# Patient Record
Sex: FEMALE | Race: WHITE | NOT HISPANIC OR LATINO | URBAN - METROPOLITAN AREA
[De-identification: names, ages, dates, MRNs, and addresses within clinical notes are randomized per-mention and may not be internally consistent; named-entity substitution may affect disease eponyms.]

---

## 2020-10-23 PROBLEM — Z00.00 ENCOUNTER FOR PREVENTIVE HEALTH EXAMINATION: Status: ACTIVE | Noted: 2020-10-23

## 2020-10-24 ENCOUNTER — OUTPATIENT (OUTPATIENT)
Dept: OUTPATIENT SERVICES | Facility: HOSPITAL | Age: 85
LOS: 1 days | End: 2020-10-24
Payer: MEDICARE

## 2020-10-24 ENCOUNTER — RESULT REVIEW (OUTPATIENT)
Age: 85
End: 2020-10-24

## 2020-10-24 ENCOUNTER — APPOINTMENT (OUTPATIENT)
Dept: CT IMAGING | Facility: CLINIC | Age: 85
End: 2020-10-24

## 2020-10-24 DIAGNOSIS — Z00.8 ENCOUNTER FOR OTHER GENERAL EXAMINATION: ICD-10-CM

## 2020-10-24 PROCEDURE — 71260 CT THORAX DX C+: CPT

## 2020-10-24 PROCEDURE — 71260 CT THORAX DX C+: CPT | Mod: 26

## 2020-10-24 PROCEDURE — 74177 CT ABD & PELVIS W/CONTRAST: CPT | Mod: 26

## 2020-10-24 PROCEDURE — 74177 CT ABD & PELVIS W/CONTRAST: CPT

## 2020-10-27 ENCOUNTER — OUTPATIENT (OUTPATIENT)
Dept: OUTPATIENT SERVICES | Facility: HOSPITAL | Age: 85
LOS: 1 days | End: 2020-10-27
Payer: MEDICARE

## 2020-10-27 ENCOUNTER — APPOINTMENT (OUTPATIENT)
Dept: ULTRASOUND IMAGING | Facility: CLINIC | Age: 85
End: 2020-10-27
Payer: MEDICARE

## 2020-10-27 ENCOUNTER — RESULT REVIEW (OUTPATIENT)
Age: 85
End: 2020-10-27

## 2020-10-27 DIAGNOSIS — E03.9 HYPOTHYROIDISM, UNSPECIFIED: ICD-10-CM

## 2020-10-27 PROCEDURE — 76536 US EXAM OF HEAD AND NECK: CPT | Mod: 26

## 2020-10-27 PROCEDURE — 76536 US EXAM OF HEAD AND NECK: CPT

## 2022-05-03 ENCOUNTER — INPATIENT (INPATIENT)
Facility: HOSPITAL | Age: 87
LOS: 6 days | Discharge: NURSING, REHAB OR LONG-TERM CARE | DRG: 871 | End: 2022-05-10
Attending: INTERNAL MEDICINE | Admitting: INTERNAL MEDICINE
Payer: MEDICARE

## 2022-05-03 VITALS
HEART RATE: 72 BPM | TEMPERATURE: 100 F | DIASTOLIC BLOOD PRESSURE: 69 MMHG | HEIGHT: 69 IN | WEIGHT: 104.94 LBS | RESPIRATION RATE: 22 BRPM | SYSTOLIC BLOOD PRESSURE: 97 MMHG | OXYGEN SATURATION: 93 %

## 2022-05-03 DIAGNOSIS — J18.9 PNEUMONIA, UNSPECIFIED ORGANISM: ICD-10-CM

## 2022-05-03 LAB
ALBUMIN SERPL ELPH-MCNC: 1.9 G/DL — LOW (ref 3.3–5)
ALP SERPL-CCNC: 93 U/L — SIGNIFICANT CHANGE UP (ref 30–120)
ALT FLD-CCNC: 47 U/L DA — SIGNIFICANT CHANGE UP (ref 10–60)
ANION GAP SERPL CALC-SCNC: 9 MMOL/L — SIGNIFICANT CHANGE UP (ref 5–17)
APPEARANCE UR: CLEAR — SIGNIFICANT CHANGE UP
APTT BLD: 30.9 SEC — SIGNIFICANT CHANGE UP (ref 27.5–35.5)
AST SERPL-CCNC: 47 U/L — HIGH (ref 10–40)
B PERT DNA SPEC QL NAA+PROBE: SIGNIFICANT CHANGE UP
BASOPHILS # BLD AUTO: 0.04 K/UL — SIGNIFICANT CHANGE UP (ref 0–0.2)
BASOPHILS NFR BLD AUTO: 0.2 % — SIGNIFICANT CHANGE UP (ref 0–2)
BILIRUB SERPL-MCNC: 0.4 MG/DL — SIGNIFICANT CHANGE UP (ref 0.2–1.2)
BILIRUB UR-MCNC: NEGATIVE — SIGNIFICANT CHANGE UP
BUN SERPL-MCNC: 54 MG/DL — HIGH (ref 7–23)
C PNEUM DNA SPEC QL NAA+PROBE: SIGNIFICANT CHANGE UP
CALCIUM SERPL-MCNC: 8.9 MG/DL — SIGNIFICANT CHANGE UP (ref 8.4–10.5)
CHLORIDE SERPL-SCNC: 96 MMOL/L — SIGNIFICANT CHANGE UP (ref 96–108)
CO2 SERPL-SCNC: 31 MMOL/L — SIGNIFICANT CHANGE UP (ref 22–31)
COLOR SPEC: YELLOW — SIGNIFICANT CHANGE UP
CREAT SERPL-MCNC: 1.1 MG/DL — SIGNIFICANT CHANGE UP (ref 0.5–1.3)
DIFF PNL FLD: ABNORMAL
EGFR: 48 ML/MIN/1.73M2 — LOW
EOSINOPHIL # BLD AUTO: 0 K/UL — SIGNIFICANT CHANGE UP (ref 0–0.5)
EOSINOPHIL NFR BLD AUTO: 0 % — SIGNIFICANT CHANGE UP (ref 0–6)
FLUAV H1 2009 PAND RNA SPEC QL NAA+PROBE: DETECTED
FLUAV H1 RNA SPEC QL NAA+PROBE: SIGNIFICANT CHANGE UP
FLUAV H3 RNA SPEC QL NAA+PROBE: SIGNIFICANT CHANGE UP
FLUAV SUBTYP SPEC NAA+PROBE: SIGNIFICANT CHANGE UP
FLUBV RNA SPEC QL NAA+PROBE: SIGNIFICANT CHANGE UP
GLUCOSE SERPL-MCNC: 140 MG/DL — HIGH (ref 70–99)
GLUCOSE UR QL: NEGATIVE MG/DL — SIGNIFICANT CHANGE UP
HADV DNA SPEC QL NAA+PROBE: SIGNIFICANT CHANGE UP
HCOV PNL SPEC NAA+PROBE: SIGNIFICANT CHANGE UP
HCT VFR BLD CALC: 37.8 % — SIGNIFICANT CHANGE UP (ref 34.5–45)
HGB BLD-MCNC: 12.4 G/DL — SIGNIFICANT CHANGE UP (ref 11.5–15.5)
HMPV RNA SPEC QL NAA+PROBE: SIGNIFICANT CHANGE UP
HPIV1 RNA SPEC QL NAA+PROBE: SIGNIFICANT CHANGE UP
HPIV2 RNA SPEC QL NAA+PROBE: SIGNIFICANT CHANGE UP
HPIV3 RNA SPEC QL NAA+PROBE: SIGNIFICANT CHANGE UP
HPIV4 RNA SPEC QL NAA+PROBE: SIGNIFICANT CHANGE UP
IMM GRANULOCYTES NFR BLD AUTO: 1 % — SIGNIFICANT CHANGE UP (ref 0–1.5)
INR BLD: 2.11 RATIO — HIGH (ref 0.88–1.16)
KETONES UR-MCNC: NEGATIVE — SIGNIFICANT CHANGE UP
LACTATE SERPL-SCNC: 1.8 MMOL/L — SIGNIFICANT CHANGE UP (ref 0.7–2)
LEUKOCYTE ESTERASE UR-ACNC: ABNORMAL
LYMPHOCYTES # BLD AUTO: 1.66 K/UL — SIGNIFICANT CHANGE UP (ref 1–3.3)
LYMPHOCYTES # BLD AUTO: 7.5 % — LOW (ref 13–44)
MCHC RBC-ENTMCNC: 31 PG — SIGNIFICANT CHANGE UP (ref 27–34)
MCHC RBC-ENTMCNC: 32.8 GM/DL — SIGNIFICANT CHANGE UP (ref 32–36)
MCV RBC AUTO: 94.5 FL — SIGNIFICANT CHANGE UP (ref 80–100)
MONOCYTES # BLD AUTO: 0.66 K/UL — SIGNIFICANT CHANGE UP (ref 0–0.9)
MONOCYTES NFR BLD AUTO: 3 % — SIGNIFICANT CHANGE UP (ref 2–14)
NEUTROPHILS # BLD AUTO: 19.62 K/UL — HIGH (ref 1.8–7.4)
NEUTROPHILS NFR BLD AUTO: 88.3 % — HIGH (ref 43–77)
NITRITE UR-MCNC: NEGATIVE — SIGNIFICANT CHANGE UP
NRBC # BLD: 0 /100 WBCS — SIGNIFICANT CHANGE UP (ref 0–0)
NT-PROBNP SERPL-SCNC: 2077 PG/ML — HIGH (ref 0–450)
PH UR: 5 — SIGNIFICANT CHANGE UP (ref 5–8)
PLATELET # BLD AUTO: 356 K/UL — SIGNIFICANT CHANGE UP (ref 150–400)
POTASSIUM SERPL-MCNC: 3.6 MMOL/L — SIGNIFICANT CHANGE UP (ref 3.5–5.3)
POTASSIUM SERPL-SCNC: 3.6 MMOL/L — SIGNIFICANT CHANGE UP (ref 3.5–5.3)
PROT SERPL-MCNC: 7.3 G/DL — SIGNIFICANT CHANGE UP (ref 6–8.3)
PROT UR-MCNC: 15 MG/DL
PROTHROM AB SERPL-ACNC: 24.5 SEC — HIGH (ref 10.5–13.4)
RAPID RVP RESULT: DETECTED
RBC # BLD: 4 M/UL — SIGNIFICANT CHANGE UP (ref 3.8–5.2)
RBC # FLD: 14.9 % — HIGH (ref 10.3–14.5)
RV+EV RNA SPEC QL NAA+PROBE: SIGNIFICANT CHANGE UP
SARS-COV-2 RNA SPEC QL NAA+PROBE: SIGNIFICANT CHANGE UP
SODIUM SERPL-SCNC: 136 MMOL/L — SIGNIFICANT CHANGE UP (ref 135–145)
SP GR SPEC: 1.01 — SIGNIFICANT CHANGE UP (ref 1.01–1.02)
TROPONIN I, HIGH SENSITIVITY RESULT: 27.6 NG/L — SIGNIFICANT CHANGE UP
UROBILINOGEN FLD QL: NEGATIVE MG/DL — SIGNIFICANT CHANGE UP
WBC # BLD: 22.21 K/UL — HIGH (ref 3.8–10.5)
WBC # FLD AUTO: 22.21 K/UL — HIGH (ref 3.8–10.5)

## 2022-05-03 PROCEDURE — 93010 ELECTROCARDIOGRAM REPORT: CPT

## 2022-05-03 PROCEDURE — 99285 EMERGENCY DEPT VISIT HI MDM: CPT | Mod: CS

## 2022-05-03 PROCEDURE — 71045 X-RAY EXAM CHEST 1 VIEW: CPT | Mod: 26

## 2022-05-03 RX ORDER — ACETAMINOPHEN 500 MG
650 TABLET ORAL ONCE
Refills: 0 | Status: COMPLETED | OUTPATIENT
Start: 2022-05-03 | End: 2022-05-03

## 2022-05-03 RX ORDER — PIPERACILLIN AND TAZOBACTAM 4; .5 G/20ML; G/20ML
3.38 INJECTION, POWDER, LYOPHILIZED, FOR SOLUTION INTRAVENOUS EVERY 8 HOURS
Refills: 0 | Status: COMPLETED | OUTPATIENT
Start: 2022-05-03 | End: 2022-05-10

## 2022-05-03 RX ORDER — SODIUM CHLORIDE 9 MG/ML
1000 INJECTION INTRAMUSCULAR; INTRAVENOUS; SUBCUTANEOUS ONCE
Refills: 0 | Status: COMPLETED | OUTPATIENT
Start: 2022-05-03 | End: 2022-05-03

## 2022-05-03 RX ORDER — ACETAMINOPHEN 500 MG
650 TABLET ORAL EVERY 6 HOURS
Refills: 0 | Status: DISCONTINUED | OUTPATIENT
Start: 2022-05-03 | End: 2022-05-10

## 2022-05-03 RX ORDER — SODIUM CHLORIDE 9 MG/ML
1000 INJECTION INTRAMUSCULAR; INTRAVENOUS; SUBCUTANEOUS
Refills: 0 | Status: DISCONTINUED | OUTPATIENT
Start: 2022-05-03 | End: 2022-05-04

## 2022-05-03 RX ORDER — PIPERACILLIN AND TAZOBACTAM 4; .5 G/20ML; G/20ML
3.38 INJECTION, POWDER, LYOPHILIZED, FOR SOLUTION INTRAVENOUS ONCE
Refills: 0 | Status: COMPLETED | OUTPATIENT
Start: 2022-05-03 | End: 2022-05-03

## 2022-05-03 RX ORDER — AMIODARONE HYDROCHLORIDE 400 MG/1
200 TABLET ORAL DAILY
Refills: 0 | Status: DISCONTINUED | OUTPATIENT
Start: 2022-05-03 | End: 2022-05-10

## 2022-05-03 RX ORDER — IPRATROPIUM/ALBUTEROL SULFATE 18-103MCG
3 AEROSOL WITH ADAPTER (GRAM) INHALATION EVERY 6 HOURS
Refills: 0 | Status: DISCONTINUED | OUTPATIENT
Start: 2022-05-03 | End: 2022-05-10

## 2022-05-03 RX ORDER — ONDANSETRON 8 MG/1
4 TABLET, FILM COATED ORAL EVERY 8 HOURS
Refills: 0 | Status: DISCONTINUED | OUTPATIENT
Start: 2022-05-03 | End: 2022-05-10

## 2022-05-03 RX ORDER — LACTOBACILLUS ACIDOPHILUS 100MM CELL
1 CAPSULE ORAL
Refills: 0 | Status: DISCONTINUED | OUTPATIENT
Start: 2022-05-03 | End: 2022-05-10

## 2022-05-03 RX ORDER — APIXABAN 2.5 MG/1
2.5 TABLET, FILM COATED ORAL EVERY 12 HOURS
Refills: 0 | Status: DISCONTINUED | OUTPATIENT
Start: 2022-05-03 | End: 2022-05-10

## 2022-05-03 RX ORDER — LANOLIN ALCOHOL/MO/W.PET/CERES
3 CREAM (GRAM) TOPICAL AT BEDTIME
Refills: 0 | Status: DISCONTINUED | OUTPATIENT
Start: 2022-05-03 | End: 2022-05-10

## 2022-05-03 RX ORDER — MIDODRINE HYDROCHLORIDE 2.5 MG/1
10 TABLET ORAL THREE TIMES A DAY
Refills: 0 | Status: DISCONTINUED | OUTPATIENT
Start: 2022-05-03 | End: 2022-05-10

## 2022-05-03 RX ADMIN — SODIUM CHLORIDE 60 MILLILITER(S): 9 INJECTION INTRAMUSCULAR; INTRAVENOUS; SUBCUTANEOUS at 22:16

## 2022-05-03 RX ADMIN — Medication 75 MILLIGRAM(S): at 21:21

## 2022-05-03 RX ADMIN — SODIUM CHLORIDE 1000 MILLILITER(S): 9 INJECTION INTRAMUSCULAR; INTRAVENOUS; SUBCUTANEOUS at 15:40

## 2022-05-03 RX ADMIN — PIPERACILLIN AND TAZOBACTAM 3.38 GRAM(S): 4; .5 INJECTION, POWDER, LYOPHILIZED, FOR SOLUTION INTRAVENOUS at 16:10

## 2022-05-03 RX ADMIN — PIPERACILLIN AND TAZOBACTAM 25 GRAM(S): 4; .5 INJECTION, POWDER, LYOPHILIZED, FOR SOLUTION INTRAVENOUS at 21:21

## 2022-05-03 RX ADMIN — SODIUM CHLORIDE 1000 MILLILITER(S): 9 INJECTION INTRAMUSCULAR; INTRAVENOUS; SUBCUTANEOUS at 16:40

## 2022-05-03 RX ADMIN — SODIUM CHLORIDE 60 MILLILITER(S): 9 INJECTION INTRAMUSCULAR; INTRAVENOUS; SUBCUTANEOUS at 18:41

## 2022-05-03 RX ADMIN — PIPERACILLIN AND TAZOBACTAM 200 GRAM(S): 4; .5 INJECTION, POWDER, LYOPHILIZED, FOR SOLUTION INTRAVENOUS at 15:40

## 2022-05-03 RX ADMIN — Medication 650 MILLIGRAM(S): at 16:05

## 2022-05-03 RX ADMIN — Medication 650 MILLIGRAM(S): at 17:05

## 2022-05-03 NOTE — H&P ADULT - NSHPSOCIALHISTORY_GEN_ALL_CORE
Social History:    Marital Status:   Occupation:   Lives with:     Substance Use :  Tobacco Usage:  (   ) never smoked   (   ) former smoker   (   ) current smoker  (     ) pack year  (        ) last tobacco use date  Alcohol Usage:    (     ) Advanced Directives: (     ) declined   [  ] health care proxy Social History:    Marital Status:   Occupation: Retired  Lives with: Alone at SNF    Substance Use : Denies  Tobacco Usage:  Denies  Alcohol Usage: Denies    (X) Advanced Directives: (     ) declined   [X] health care proxy

## 2022-05-03 NOTE — H&P ADULT - NSHPPHYSICALEXAM_GEN_ALL_CORE
Vital Signs Last 24 Hrs  T(C): 37.8 (03 May 2022 14:55), Max: 37.8 (03 May 2022 14:55)  T(F): 100 (03 May 2022 14:55), Max: 100 (03 May 2022 14:55)  HR: 64 (03 May 2022 17:30) (64 - 72)  BP: 91/55 (03 May 2022 17:30) (91/55 - 113/58)  BP(mean): --  RR: 22 (03 May 2022 17:30) (20 - 22)  SpO2: 94% (03 May 2022 17:30) (90% - 96%) Vital Signs Last 24 Hrs  T(C): 37.8 (03 May 2022 14:55), Max: 37.8 (03 May 2022 14:55)  T(F): 100 (03 May 2022 14:55), Max: 100 (03 May 2022 14:55)  HR: 64 (03 May 2022 17:30) (64 - 72)  BP: 91/55 (03 May 2022 17:30) (91/55 - 113/58)  BP(mean): --  RR: 22 (03 May 2022 17:30) (20 - 22)  SpO2: 94% (03 May 2022 17:30) (90% - 96%)    PHYSICAL EXAM:  GENERAL: NAD, well-groomed, well-developed  HEAD:  Atraumatic, Normocephalic  EYES: Pallor +  ENMT: Moist mucous membranes, no lesions  NECK: Supple.  CHEST/LUNG: Decreased breath sounds at bases, right lower lobe rhonchi +. Occasional rhonchi left base.   HEART: S1, S2.   ABDOMEN: Soft, Nontender, Nondistended; Bowel sounds present  EXTREMITIES:  2+ Peripheral Pulses, No clubbing, cyanosis, or edema  MS: No joint swelling or deformity.   LYMPH: No lymphadenopathy noted  SKIN: No rashes or lesions  NERVOUS SYSTEM:  No focal deficit.   PSYCH:  Awake and alert but confused.

## 2022-05-03 NOTE — H&P ADULT - ASSESSMENT
89 years old female with past medical history of atrial fibrillation, COPD, history of pericardial effusion, pleural effusion in past, orthostatic hypotension, dementia, who was noted to have shortness of breath and low blood pressure at SNF. Patient was sent in to ER. In Er, patient is noted to have elevated WBC, Pneumonia on chest x-ray. She is being admitted for further care.

## 2022-05-03 NOTE — ED ADULT NURSE NOTE - CHPI ED NUR SYMPTOMS NEG
no body aches/no chest pain/no chills/no cough/no diaphoresis/no edema/no headache/no hemoptysis/no wheezing

## 2022-05-03 NOTE — PATIENT PROFILE ADULT - FALL HARM RISK - HARM RISK INTERVENTIONS

## 2022-05-03 NOTE — ED ADULT NURSE REASSESSMENT NOTE - NS ED NURSE REASSESS COMMENT FT1
Pt straight cathed using sterile technique.  90 ccs of dark yellow urine output.  Pt tolerated procedure well. Sterile specimen collected. UA and Culture sent.

## 2022-05-03 NOTE — H&P ADULT - NSICDXPASTMEDICALHX_GEN_ALL_CORE_FT
PAST MEDICAL HISTORY:  Chronic atrial fibrillation     COPD (chronic obstructive pulmonary disease)     Orthostatic hypotension     Pericardial effusion     Pleural effusion

## 2022-05-03 NOTE — ED ADULT NURSE NOTE - OBJECTIVE STATEMENT
89 YOF A&OX2 brought in by EMS for shortness of breath. as per EMS pt had low oxygen in assisted living facility. pt noted to have 92-93% oxygen on room air. pt placed on 3L NC. pt denies shortness of breath at this time. pt placed on cardiac monitor and EKG completed in ED. pt denies headaches, dizziness, blurry vision, chest pain.  fall precautions and isolation precautions in place. safety maintained.

## 2022-05-03 NOTE — ED ADULT NURSE NOTE - NS ED NOTE  TALK SOMEONE YN
To [facility].       03/05/20 1527   Post-Acute Status   Post-Acute Authorization Home Health/Hospice   Home Health/Hospice Status Referrals Sent      No

## 2022-05-03 NOTE — H&P ADULT - NSHPLABSRESULTS_GEN_ALL_CORE
12.4   22.21 )-----------( 356      ( 03 May 2022 15:36 )             37.8     03 May 2022 15:36    136    |  96     |  54     ----------------------------<  140    3.6     |  31     |  1.10     Ca    8.9        03 May 2022 15:36    TPro  7.3    /  Alb  1.9    /  TBili  0.4    /  DBili  x      /  AST  47     /  ALT  47     /  AlkPhos  93     03 May 2022 15:36    CAPILLARY BLOOD GLUCOSE        PT/INR - ( 03 May 2022 15:36 )   PT: 24.5 sec;   INR: 2.11 ratio         PTT - ( 03 May 2022 15:36 )  PTT:30.9 sec

## 2022-05-03 NOTE — H&P ADULT - NSHPREVIEWOFSYSTEMS_GEN_ALL_CORE
REVIEW OF SYSTEMS: ROS is very limited as patient is very poor historian. It is as per HPI, rest is deemed negative.   CONSTITUTIONAL: + fatigue  EYES: No eye pain, or discharge  ENMT: No sinus or throat pain  NECK: No pain or stiffness  BREASTS: No pain, masses, or nipple discharge  RESPIRATORY: + shortness of breath  CARDIOVASCULAR: No chest pain, palpitations, dizziness, or leg swelling  GASTROINTESTINAL: No abdominal or epigastric pain. No nausea, vomiting.  GENITOURINARY: No dysuria, frequency, hematuria, or incontinence  NEUROLOGICAL: No loss of strength, numbness, or tremors  SKIN: No itching, burning, rashes, or lesions   LYMPH NODES: No enlarged glands  ENDOCRINE: No polydipsia or polyuria  MUSCULOSKELETAL: No muscle, back, or extremity pain.  HEME/LYMPH: No easy bruising, or bleeding gums  ALLERGY AND IMMUNOLOGIC: No hives or eczema

## 2022-05-03 NOTE — ED PROVIDER NOTE - CLINICAL SUMMARY MEDICAL DECISION MAKING FREE TEXT BOX
Lizzy PARRISH for attending Dr. Fisher: 88 y/o female with a PMHx orthostatic hypotension and Afib from [place] brought in by EMS for evaluation of SOB and hypoxia. Pt had a low-grade fever upon triage.     Plan: EKG, CXR, labs, IV fluids, and antibiotics. Lizzy PARRISH for attending Dr. Fisher: 89 y/o female with a PMHx orthostatic hypotension and Afib from [place] brought in by EMS for evaluation of SOB and hypoxia. Pt had a low-grade fever upon triage.     Plan: EKG, CXR, labs, IV fluids, antibiotics, and Tylenol.

## 2022-05-03 NOTE — ED PROVIDER NOTE - OBJECTIVE STATEMENT
89 y/o female with PMHx orthostatic hypotension presn 88 y/o female with PMHx orthostatic hypotension and [facility] BIB[facility] from [place] due to SOB. As per EMS, pt found to be hypoxic in the 70s. As per Dr. harding, reports patient on augmentin for pneumonia and flu outbreak in [place]. pt denies chest pain, SOB, fever, vomiting, abd pain, leg swelling, or any other complaints. 88 y/o female with PMHx orthostatic hypotension and A FIb BIBA from SNF due to SOB. As per EMS, pt found to be hypoxic in the 70s. As per Dr. harding, reports patient on augmentin for pneumonia and flu outbreak in SNF. pt denies chest pain, SOB, fever, vomiting, abd pain, leg swelling, or any other complaints.

## 2022-05-03 NOTE — ED PROVIDER NOTE - PHYSICAL EXAMINATION
Constitutional: Awake, Alert, non-toxic. NAD  HEAD: Normocephalic, atraumatic.   EYES: EOM intact, conjunctiva and sclera are clear bilaterally.   ENT: No rhinorrhea, patent, mucous membranes pink/moist, no drooling or stridor.   NECK: Supple, non-tender  CARDIOVASCULAR: Normal S1, S2; regular rate and rhythm.  RESPIRATORY: Normal respiratory effort; breath sounds CTAB, no wheezes, rhonchi, or rales. Speaking in full sentences. No accessory muscle use.   ABDOMEN: Soft; non-tender, non-distended.   EXTREMITIES: Full passive and active ROM in all extremities; non-tender to palpation; distal pulses palpable and symmetric, no le edema   SKIN: Warm, dry; good skin turgor, no apparent lesions or rashes, no ecchymosis, brisk capillary refill.  NEURO: A&O x1. Sensory and motor functions are grossly intact. Speech is normal. cn 2-12 intact.

## 2022-05-03 NOTE — H&P ADULT - PROBLEM SELECTOR PLAN 2
Patient with pneumonia, possible HCAP vs post influenza pneumonia.   Will place on empiric antibiotics.   Concern is for Gram negative organisms.   Follow culture data.   ID and Pulmonary consult.

## 2022-05-03 NOTE — H&P ADULT - HISTORY OF PRESENT ILLNESS
89 years old female with past medical history of atrial fibrillation, COPD, history of pericardial effusion, pleural effusion in past, orthostatic hypotension, dementia, who was noted to have shortness of breath and low blood pressure at SNF. Patient was sent in to ER. In Er, patient is noted to have elevated WBC, Pneumonia on chest x-ray. She is being admitted for further care.     Patient is very poor historian. All information is from patient's chart and NH records.   She denies any chest pain or pressure.   No nausea or vomiting.

## 2022-05-03 NOTE — ED PROVIDER NOTE - NS ED ATTENDING STATEMENT MOD
This was a shared visit with the AR[facility]. I reviewed and verified the documentation and independently performed the documented:

## 2022-05-04 DIAGNOSIS — J18.9 PNEUMONIA, UNSPECIFIED ORGANISM: ICD-10-CM

## 2022-05-04 DIAGNOSIS — J10.1 INFLUENZA DUE TO OTHER IDENTIFIED INFLUENZA VIRUS WITH OTHER RESPIRATORY MANIFESTATIONS: ICD-10-CM

## 2022-05-04 DIAGNOSIS — A41.9 SEPSIS, UNSPECIFIED ORGANISM: ICD-10-CM

## 2022-05-04 DIAGNOSIS — I48.20 CHRONIC ATRIAL FIBRILLATION, UNSPECIFIED: ICD-10-CM

## 2022-05-04 DIAGNOSIS — Z29.9 ENCOUNTER FOR PROPHYLACTIC MEASURES, UNSPECIFIED: ICD-10-CM

## 2022-05-04 DIAGNOSIS — I95.1 ORTHOSTATIC HYPOTENSION: ICD-10-CM

## 2022-05-04 DIAGNOSIS — J44.9 CHRONIC OBSTRUCTIVE PULMONARY DISEASE, UNSPECIFIED: ICD-10-CM

## 2022-05-04 LAB
A1C WITH ESTIMATED AVERAGE GLUCOSE RESULT: 5.8 % — HIGH (ref 4–5.6)
ALBUMIN SERPL ELPH-MCNC: 1.6 G/DL — LOW (ref 3.3–5)
ALP SERPL-CCNC: 78 U/L — SIGNIFICANT CHANGE UP (ref 30–120)
ALT FLD-CCNC: 33 U/L DA — SIGNIFICANT CHANGE UP (ref 10–60)
ANION GAP SERPL CALC-SCNC: 7 MMOL/L — SIGNIFICANT CHANGE UP (ref 5–17)
AST SERPL-CCNC: 38 U/L — SIGNIFICANT CHANGE UP (ref 10–40)
BASOPHILS # BLD AUTO: 0.03 K/UL — SIGNIFICANT CHANGE UP (ref 0–0.2)
BASOPHILS NFR BLD AUTO: 0.2 % — SIGNIFICANT CHANGE UP (ref 0–2)
BILIRUB SERPL-MCNC: 0.4 MG/DL — SIGNIFICANT CHANGE UP (ref 0.2–1.2)
BUN SERPL-MCNC: 39 MG/DL — HIGH (ref 7–23)
CALCIUM SERPL-MCNC: 8.2 MG/DL — LOW (ref 8.4–10.5)
CHLORIDE SERPL-SCNC: 103 MMOL/L — SIGNIFICANT CHANGE UP (ref 96–108)
CHOLEST SERPL-MCNC: 106 MG/DL — SIGNIFICANT CHANGE UP
CO2 SERPL-SCNC: 30 MMOL/L — SIGNIFICANT CHANGE UP (ref 22–31)
CREAT SERPL-MCNC: 0.93 MG/DL — SIGNIFICANT CHANGE UP (ref 0.5–1.3)
EGFR: 59 ML/MIN/1.73M2 — LOW
EOSINOPHIL # BLD AUTO: 0.01 K/UL — SIGNIFICANT CHANGE UP (ref 0–0.5)
EOSINOPHIL NFR BLD AUTO: 0.1 % — SIGNIFICANT CHANGE UP (ref 0–6)
ESTIMATED AVERAGE GLUCOSE: 120 MG/DL — HIGH (ref 68–114)
GLUCOSE SERPL-MCNC: 117 MG/DL — HIGH (ref 70–99)
HCT VFR BLD CALC: 33.7 % — LOW (ref 34.5–45)
HDLC SERPL-MCNC: 20 MG/DL — LOW
HGB BLD-MCNC: 11 G/DL — LOW (ref 11.5–15.5)
IMM GRANULOCYTES NFR BLD AUTO: 1 % — SIGNIFICANT CHANGE UP (ref 0–1.5)
LIPID PNL WITH DIRECT LDL SERPL: 57 MG/DL — SIGNIFICANT CHANGE UP
LYMPHOCYTES # BLD AUTO: 1.39 K/UL — SIGNIFICANT CHANGE UP (ref 1–3.3)
LYMPHOCYTES # BLD AUTO: 8 % — LOW (ref 13–44)
MCHC RBC-ENTMCNC: 31.5 PG — SIGNIFICANT CHANGE UP (ref 27–34)
MCHC RBC-ENTMCNC: 32.6 GM/DL — SIGNIFICANT CHANGE UP (ref 32–36)
MCV RBC AUTO: 96.6 FL — SIGNIFICANT CHANGE UP (ref 80–100)
MONOCYTES # BLD AUTO: 0.55 K/UL — SIGNIFICANT CHANGE UP (ref 0–0.9)
MONOCYTES NFR BLD AUTO: 3.2 % — SIGNIFICANT CHANGE UP (ref 2–14)
NEUTROPHILS # BLD AUTO: 15.12 K/UL — HIGH (ref 1.8–7.4)
NEUTROPHILS NFR BLD AUTO: 87.5 % — HIGH (ref 43–77)
NON HDL CHOLESTEROL: 87 MG/DL — SIGNIFICANT CHANGE UP
NRBC # BLD: 0 /100 WBCS — SIGNIFICANT CHANGE UP (ref 0–0)
PLATELET # BLD AUTO: 295 K/UL — SIGNIFICANT CHANGE UP (ref 150–400)
POTASSIUM SERPL-MCNC: 2.9 MMOL/L — CRITICAL LOW (ref 3.5–5.3)
POTASSIUM SERPL-SCNC: 2.9 MMOL/L — CRITICAL LOW (ref 3.5–5.3)
PROCALCITONIN SERPL-MCNC: 0.22 NG/ML — HIGH (ref 0.02–0.1)
PROT SERPL-MCNC: 6.3 G/DL — SIGNIFICANT CHANGE UP (ref 6–8.3)
RBC # BLD: 3.49 M/UL — LOW (ref 3.8–5.2)
RBC # FLD: 15 % — HIGH (ref 10.3–14.5)
SODIUM SERPL-SCNC: 140 MMOL/L — SIGNIFICANT CHANGE UP (ref 135–145)
TRIGL SERPL-MCNC: 147 MG/DL — SIGNIFICANT CHANGE UP
WBC # BLD: 17.28 K/UL — HIGH (ref 3.8–10.5)
WBC # FLD AUTO: 17.28 K/UL — HIGH (ref 3.8–10.5)

## 2022-05-04 RX ORDER — POTASSIUM CHLORIDE 20 MEQ
20 PACKET (EA) ORAL
Refills: 0 | Status: DISCONTINUED | OUTPATIENT
Start: 2022-05-04 | End: 2022-05-04

## 2022-05-04 RX ORDER — SODIUM CHLORIDE 9 MG/ML
1000 INJECTION INTRAMUSCULAR; INTRAVENOUS; SUBCUTANEOUS
Refills: 0 | Status: DISCONTINUED | OUTPATIENT
Start: 2022-05-04 | End: 2022-05-05

## 2022-05-04 RX ORDER — POTASSIUM CHLORIDE 20 MEQ
20 PACKET (EA) ORAL EVERY 4 HOURS
Refills: 0 | Status: COMPLETED | OUTPATIENT
Start: 2022-05-04 | End: 2022-05-04

## 2022-05-04 RX ORDER — POTASSIUM CHLORIDE 20 MEQ
10 PACKET (EA) ORAL
Refills: 0 | Status: COMPLETED | OUTPATIENT
Start: 2022-05-04 | End: 2022-05-04

## 2022-05-04 RX ADMIN — MIDODRINE HYDROCHLORIDE 10 MILLIGRAM(S): 2.5 TABLET ORAL at 17:02

## 2022-05-04 RX ADMIN — Medication 100 MILLIEQUIVALENT(S): at 12:25

## 2022-05-04 RX ADMIN — Medication 600 MILLIGRAM(S): at 18:15

## 2022-05-04 RX ADMIN — Medication 1 TABLET(S): at 08:31

## 2022-05-04 RX ADMIN — Medication 100 MILLIEQUIVALENT(S): at 13:47

## 2022-05-04 RX ADMIN — MIDODRINE HYDROCHLORIDE 10 MILLIGRAM(S): 2.5 TABLET ORAL at 05:24

## 2022-05-04 RX ADMIN — Medication 20 MILLIEQUIVALENT(S): at 18:14

## 2022-05-04 RX ADMIN — Medication 100 MILLIEQUIVALENT(S): at 11:13

## 2022-05-04 RX ADMIN — Medication 1 TABLET(S): at 17:02

## 2022-05-04 RX ADMIN — Medication 30 MILLIGRAM(S): at 05:24

## 2022-05-04 RX ADMIN — Medication 20 MILLIEQUIVALENT(S): at 15:23

## 2022-05-04 RX ADMIN — MIDODRINE HYDROCHLORIDE 10 MILLIGRAM(S): 2.5 TABLET ORAL at 12:25

## 2022-05-04 RX ADMIN — Medication 600 MILLIGRAM(S): at 05:24

## 2022-05-04 RX ADMIN — APIXABAN 2.5 MILLIGRAM(S): 2.5 TABLET, FILM COATED ORAL at 18:15

## 2022-05-04 RX ADMIN — Medication 1 TABLET(S): at 12:25

## 2022-05-04 RX ADMIN — Medication 30 MILLIGRAM(S): at 18:15

## 2022-05-04 RX ADMIN — PIPERACILLIN AND TAZOBACTAM 25 GRAM(S): 4; .5 INJECTION, POWDER, LYOPHILIZED, FOR SOLUTION INTRAVENOUS at 22:09

## 2022-05-04 RX ADMIN — APIXABAN 2.5 MILLIGRAM(S): 2.5 TABLET, FILM COATED ORAL at 05:25

## 2022-05-04 RX ADMIN — SODIUM CHLORIDE 40 MILLILITER(S): 9 INJECTION INTRAMUSCULAR; INTRAVENOUS; SUBCUTANEOUS at 15:23

## 2022-05-04 RX ADMIN — SODIUM CHLORIDE 60 MILLILITER(S): 9 INJECTION INTRAMUSCULAR; INTRAVENOUS; SUBCUTANEOUS at 11:14

## 2022-05-04 RX ADMIN — PIPERACILLIN AND TAZOBACTAM 25 GRAM(S): 4; .5 INJECTION, POWDER, LYOPHILIZED, FOR SOLUTION INTRAVENOUS at 15:22

## 2022-05-04 RX ADMIN — PIPERACILLIN AND TAZOBACTAM 25 GRAM(S): 4; .5 INJECTION, POWDER, LYOPHILIZED, FOR SOLUTION INTRAVENOUS at 05:23

## 2022-05-04 NOTE — CONSULT NOTE ADULT - SUBJECTIVE AND OBJECTIVE BOX
HPI:  88YO F PMH atrial fibrillation, COPD, history of pericardial effusion, pleural effusion in past, orthostatic hypotension, dementia, presented from [facility] with  shortness of breath and low blood pressure found to have Influenza A with ovidio pneumonia. WBC 22K. Pt is a poor historian. No documented n/v/d CP abd pain.    Infectious Disease consult was called to evaluate pt.    Past Medical & Surgical Hx:  PAST MEDICAL & SURGICAL HISTORY:  Chronic atrial fibrillation  Orthostatic hypotension  COPD (chronic obstructive pulmonary disease)  Pericardial effusion  Pleural effusion  No significant past surgical history      Social History--  EtOH: denies   Tobacco: denies   Drug Use: denies     FAMILY HISTORY:  Noncontributory    Allergies  No Known Allergies    Intolerances  NONE      Home Medications:      Current Inpatient Medications :    ANTIBIOTICS:   oseltamivir 30 milliGRAM(s) Oral two times a day  piperacillin/tazobactam IVPB.. 3.375 Gram(s) IV Intermittent every 8 hours      OTHER RELEVANT MEDICATIONS :  acetaminophen     Tablet .. 650 milliGRAM(s) Oral every 6 hours PRN  albuterol/ipratropium for Nebulization 3 milliLiter(s) Nebulizer every 6 hours PRN  aluminum hydroxide/magnesium hydroxide/simethicone Suspension 30 milliLiter(s) Oral every 4 hours PRN  aMIOdarone    Tablet 200 milliGRAM(s) Oral daily  apixaban 2.5 milliGRAM(s) Oral every 12 hours  guaiFENesin  milliGRAM(s) Oral every 12 hours  melatonin 3 milliGRAM(s) Oral at bedtime PRN  midodrine. 10 milliGRAM(s) Oral three times a day  ondansetron Injectable 4 milliGRAM(s) IV Push every 8 hours PRN  potassium chloride   Powder 20 milliEquivalent(s) Oral every 4 hours  sodium chloride 0.9%. 1000 milliLiter(s) IV Continuous <Continuous>      ROS:  Unable to obtain due to : Dementia    Physical Exam:  Vital Signs Last 24 Hrs  T(C): 36.4 (04 May 2022 05:00), Max: 36.5 (04 May 2022 05:00)  T(F): 97.6 (04 May 2022 05:00), Max: 97.7 (04 May 2022 05:00)  HR: 62 (04 May 2022 05:00) (60 - 66)  BP: 97/54 (04 May 2022 05:00) (91/55 - 99/58)  BP(mean): 67 (04 May 2022 05:00) (67 - 74)  RR: 18 (04 May 2022 05:00) (18 - 93)  SpO2: 93% (04 May 2022 05:00) (90% - 98%)      General: no acute distress  Neck: supple, trachea midline  Lungs: Decreased no wheeze/rhonchi  Cardiovascular: regular rate and rhythm, S1 S2  Abdomen: soft, nontender, ND, bowel sounds normal  Neurological:  awake confused  Skin: no rash  Extremities: no edema    Labs:    Complete Blood Count + Automated Diff (05.03.22 @ 15:36)    WBC Count: 22.21 K/uL    RBC Count: 4.00 M/uL    Hemoglobin: 12.4 g/dL    Hematocrit: 37.8 %    Mean Cell Volume: 94.5 fl    Mean Cell Hemoglobin: 31.0 pg    Mean Cell Hemoglobin Conc: 32.8 gm/dL    Red Cell Distrib Width: 14.9 %    Platelet Count - Automated: 356 K/uL                        11.0   17.28 )-----------( 295      ( 04 May 2022 05:30 )             33.7   05-04    140  |  103  |  39<H>  ----------------------------<  117<H>  2.9<LL>   |  30  |  0.93    Ca    8.2<L>      04 May 2022 05:30    TPro  6.3  /  Alb  1.6<L>  /  TBili  0.4  /  DBili  x   /  AST  38  /  ALT  33  /  AlkPhos  78  05-04      RECENT CULTURES:  Respiratory Viral Panel with COVID-19 by ALEJANDRA (05.03.22 @ 15:36)    Rapid RVP Result: Detected    SARS-CoV-2: NotDetec: This Respiratory Panel uses polymerase chain reaction (PCR) to detect for  adenovirus; coronavirus (HKU1, NL63, 229E, OC43); human metapneumovirus  (hMPV); human enterovirus/rhinovirus (Entero/RV); influenza A; influenza  A/H1; influenza A/H3; influenza A/H1-2009; influenza B; parainfluenza  viruses 1, 2, 3, 4; respiratory syncytial virus; Mycoplasma pneumoniae;  Chlamydophila pneumoniae; and SARS-CoV-2.    Adenovirus (RapRVP): NotDetec    Influenza A (RapRVP): NotDetec    Influenza AH1 2009 (RapRVP): NotDetec    Influenza AH1 (RapRVP): NotDetec    Influenza AH3 (RapRVP): Detected    Influenza B (RapRVP): NotDetec    Parainfluenza 1 (RapRVP): NotDetec    Parainfluenza 2 (RapRVP): NotDetec    Parainfluenza 3 (RapRVP): NotDetec    Parainfluenza 4 (RapRVP): NotDetec    Chlamydia pneumoniae (RapRVP): NotDetec    Mycoplasma pneumoniae (RapRVP): NotDetec    Entero/Rhinovirus (RapRVP): NotDetec    hMPV (RapRVP): NotDetec    Coronavirus (229E,HKU1,NL63,OC43): NotDetec    RADIOLOGY & ADDITIONAL STUDIES:    ACC: [id number] EXAM:  XR CHEST PORTABLE URGENT 1V                          PROCEDURE DATE:  05/03/2022          INTERPRETATION:  Sepsis.    AP chest.    Normal heart size. Atherosclerotic aorta. Hyperinflated lungs. Airspace   infiltrates in the right mid and lower lung field and at the left base   consistent with bilateral pneumonia. Trace bilateral pleural effusions.   Question underlying nodular opacity left base. Correlate with chest CT.   Calcified granuloma right apex. Biapical pleural thickening.    IMPRESSION: Bilateral pneumonia. Question underlying nodular opacity left   base. Correlate with chest CT.    Assessment :   88YO F PMH atrial fibrillation, COPD, history of pericardial effusion, pleural effusion in past, orthostatic hypotension, dementia, presented from [facility] admitted with Influenza A with ovidio pneumonia. - probable superimposed bacterial pneumonia. WBC 22K.    Plan :   Tamiflu x 5 days  Cont Zosyn  Fu cultures  Get legionella and pneumococcal antigen  Trend temps and cbc  Asp precautions    Continue with present regiment .  Approptiate use of antibiotics and adverse effects reviewed.      > 45 minutes spent in direct patient care reviewing  the notes, lab data/ imaging , discussion with multidisciplinary team. All questions were addressed and answered to the best of my capacity .    Thank you for allowing me to participate in the care of your patient .      Karthikeyan Dai MD  Infectious Disease  [phone]
PULMONARY/CRITICAL CARE      Pt. is poor historian.  Patient is a 89y old  Female who presents with a chief complaint of Shortness of breath, low BP (03 May 2022 18:04)/ right pneumonia.    BRIEF HOSPITAL COURSE: ***· Chief Complaint:  89y Female complaining of shortness of breath.  · HPI Objective Statement: 88 y/o female with PMHx orthostatic hypotension and A FIb BIBA from SNF due to SOB. As per EMS, pt found to be hypoxic in the 70s. As per Dr. harding, reports patient on augmentin for pneumonia and flu outbreak in SNF. pt denies chest pain, SOB, fever, vomiting, abd pain, leg swelling, or any other complaints.  She claims she has had influenza vaccine.     Events last 24 hours: ***    PAST MEDICAL & SURGICAL HISTORY:  Chronic atrial fibrillation    Orthostatic hypotension    COPD (chronic obstructive pulmonary disease)    Pericardial effusion    Pleural effusion    No significant past surgical history      Allergies    No Known Allergies    Intolerances      FAMILY HISTORY/ social: denies cigs, etoh      Review of Systems:  CONSTITUTIONAL: No fever, chills, or fatigue  EYES: No eye pain, visual disturbances, or discharge  ENMT:  No difficulty hearing, tinnitus, vertigo; No sinus or throat pain  NECK: No pain or stiffness  RESPIRATORY: No cough, wheezing, chills or hemoptysis; mild shortness of breath  CARDIOVASCULAR: No chest pain, palpitations, dizziness, or leg swelling  GASTROINTESTINAL: No abdominal or epigastric pain. No nausea, vomiting, or hematemesis; No diarrhea or constipation. No melena or hematochezia.  GENITOURINARY: No dysuria, frequency, hematuria, or incontinence  NEUROLOGICAL: No headaches, memory loss, loss of strength, numbness, or tremors  SKIN: No itching, burning, rashes, or lesions   MUSCULOSKELETAL: No joint pain or swelling; No muscle, back, or extremity pain  PSYCHIATRIC: No depression, anxiety, mood swings, or difficulty sleeping      Medications:  oseltamivir 30 milliGRAM(s) Oral two times a day  piperacillin/tazobactam IVPB.. 3.375 Gram(s) IV Intermittent every 8 hours    aMIOdarone    Tablet 200 milliGRAM(s) Oral daily  midodrine. 10 milliGRAM(s) Oral three times a day    albuterol/ipratropium for Nebulization 3 milliLiter(s) Nebulizer every 6 hours PRN  guaiFENesin  milliGRAM(s) Oral every 12 hours    acetaminophen     Tablet .. 650 milliGRAM(s) Oral every 6 hours PRN  melatonin 3 milliGRAM(s) Oral at bedtime PRN  ondansetron Injectable 4 milliGRAM(s) IV Push every 8 hours PRN      apixaban 2.5 milliGRAM(s) Oral every 12 hours    aluminum hydroxide/magnesium hydroxide/simethicone Suspension 30 milliLiter(s) Oral every 4 hours PRN        potassium chloride  20 mEq/100 mL IVPB 20 milliEquivalent(s) IV Intermittent every 2 hours  sodium chloride 0.9%. 1000 milliLiter(s) IV Continuous <Continuous>        lactobacillus acidophilus 1 Tablet(s) Oral three times a day with meals          ICU Vital Signs Last 24 Hrs  T(C): 36.5 (04 May 2022 05:00), Max: 37.8 (03 May 2022 14:55)  T(F): 97.7 (04 May 2022 05:00), Max: 100 (03 May 2022 14:55)  HR: 62 (04 May 2022 05:00) (60 - 72)  BP: 97/54 (04 May 2022 05:00) (91/55 - 113/58)  BP(mean): 67 (04 May 2022 05:00) (67 - 74)  ABP: --  ABP(mean): --  RR: 18 (04 May 2022 05:00) (18 - 22)  SpO2: 96% (04 May 2022 05:00) (90% - 98%)    Vital Signs Last 24 Hrs  T(C): 36.5 (04 May 2022 05:00), Max: 37.8 (03 May 2022 14:55)  T(F): 97.7 (04 May 2022 05:00), Max: 100 (03 May 2022 14:55)  HR: 62 (04 May 2022 05:00) (60 - 72)  BP: 97/54 (04 May 2022 05:00) (91/55 - 113/58)  BP(mean): 67 (04 May 2022 05:00) (67 - 74)  RR: 18 (04 May 2022 05:00) (18 - 22)  SpO2: 96% (04 May 2022 05:00) (90% - 98%)        I&O's Detail        LABS:                        11.0   17.28 )-----------( 295      ( 04 May 2022 05:30 )             33.7     05-04    140  |  103  |  39<H>  ----------------------------<  117<H>  2.9<LL>   |  30  |  0.93    Ca    8.2<L>      04 May 2022 05:30    TPro  6.3  /  Alb  1.6<L>  /  TBili  0.4  /  DBili  x   /  AST  38  /  ALT  33  /  AlkPhos  78  05-04          CAPILLARY BLOOD GLUCOSE        PT/INR - ( 03 May 2022 15:36 )   PT: 24.5 sec;   INR: 2.11 ratio         PTT - ( 03 May 2022 15:36 )  PTT:30.9 sec  Urinalysis Basic - ( 03 May 2022 16:06 )    Color: Yellow / Appearance: Clear / S.015 / pH: x  Gluc: x / Ketone: Negative  / Bili: Negative / Urobili: Negative mg/dL   Blood: x / Protein: 15 mg/dL / Nitrite: Negative   Leuk Esterase: Moderate / RBC: 0-2 /HPF / WBC 6-10   Sq Epi: x / Non Sq Epi: Few / Bacteria: Few      CULTURES:      Physical Examination:    General: No acute distress.  thin elderly female sitting up comfortably    HEENT: Pupils equal, reactive to light.  Symmetric. poor dentition    PULM: Clear to auscultation bilaterally, no wheeze rhonchi rales no change percussion    CVS: irregular rate and rhythm, no murmurs, rubs, or gallops    ABD: Soft, nondistended, nontender, normoactive bowel sounds, no masses    EXT: No edema, nontender calves    SKIN: Warm and well perfused, no rashes noted.    NEURO: Alert, somewhat confused, interactive, nonfocal    RADIOLOGY: ***CXR RLL infiltrate    CRITICAL CARE TIME SPENT: ***

## 2022-05-04 NOTE — GOALS OF CARE CONVERSATION - ADVANCED CARE PLANNING - CONVERSATION DETAILS
Writer  spoke with pt son Syed. Reviewed patient's medical and social history as well as events leading to patient's hospitalization. Writer discussed patient's current diagnosis COVID, afib, pleural effusion, dementia , advanced age, debility, impaired mental status  medical condition and management, Pt arrived with [facility] stating DNR/DNI and pt son states there are no changres. Writer  spoke with pt son Syed. Reviewed patient's medical and social history as well as events leading to patient's hospitalization. Writer discussed patient's current diagnosis Flu A, afib, pleural effusion, dementia , advanced age, debility, impaired mental status  medical condition and management, Pt arrived with [facility] stating DNR/DNI and pt son states there are no changres.

## 2022-05-04 NOTE — SWALLOW BEDSIDE ASSESSMENT ADULT - SWALLOW EVAL: RECOMMENDED FEEDING/EATING TECHNIQUES
alternate food with liquid/crush medication (when feasible)/maintain upright posture during/after eating for 30 mins/no straws/oral hygiene/position upright (90 degrees)/small sips/bites

## 2022-05-04 NOTE — SWALLOW BEDSIDE ASSESSMENT ADULT - PHARYNGEAL PHASE
Delayed pharyngeal swallow inconsistent throat clear, noted to be more persistent as viscosity of PO decreased, ? baseline congestion vs. potential penetration/aspiration event/Delayed pharyngeal swallow/Throat clear post oral intake

## 2022-05-04 NOTE — PROGRESS NOTE ADULT - SUBJECTIVE AND OBJECTIVE BOX
Patient is a 89y old  Female who presents with a chief complaint of Shortness of breath, low BP (04 May 2022 12:46)    HPI:  89 years old female with past medical history of atrial fibrillation, COPD, history of pericardial effusion, pleural effusion in past, orthostatic hypotension, dementia, who was noted to have shortness of breath and low blood pressure at SNF. Patient was sent in to ER. In Er, patient is noted to have elevated WBC, Pneumonia on chest x-ray. She is being admitted for further care.     Patient is very poor historian. All information is from patient's chart and NH records.   She denies any chest pain or pressure.   No nausea or vomiting.  (03 May 2022 18:04)    INTERVAL HPI/OVERNIGHT EVENTS:  Chart reviewed, notes reviewed.   Patient seen and examined.  Being followed by following specialists.     Consultant(s) Notes Reviewed:  [X] Yes    Care Discussed with Consultants/Other Providers: [X] Yes    REVIEW OF SYSTEMS:  CONSTITUTIONAL: No fever, weight loss, or fatigue  EYES: No eye pain, or discharge  ENMT: No sinus or throat pain  NECK: No pain or stiffness  BREASTS: No pain, masses, or nipple discharge  RESPIRATORY: No cough, wheezing, chills or hemoptysis; No shortness of breath  CARDIOVASCULAR: No chest pain, palpitations, dizziness, or leg swelling  GASTROINTESTINAL: No abdominal or epigastric pain. No nausea, vomiting.  GENITOURINARY: No dysuria, frequency, hematuria, or incontinence  NEUROLOGICAL: No loss of strength, numbness, or tremors  SKIN: No itching, burning, rashes, or lesions   LYMPH NODES: No enlarged glands  ENDOCRINE: No polydipsia or polyuria  MUSCULOSKELETAL: No muscle, back, or extremity pain  PSYCHIATRIC: No depression, anxiety, mood swings.  HEME/LYMPH: No easy bruising, or bleeding gums  ALLERGY AND IMMUNOLOGIC: No hives or eczema    Allergies    No Known Allergies    Intolerances      Home Medications:    MEDICATIONS  (STANDING):  aMIOdarone    Tablet 200 milliGRAM(s) Oral daily  apixaban 2.5 milliGRAM(s) Oral every 12 hours  guaiFENesin  milliGRAM(s) Oral every 12 hours  lactobacillus acidophilus 1 Tablet(s) Oral three times a day with meals  midodrine. 10 milliGRAM(s) Oral three times a day  oseltamivir 30 milliGRAM(s) Oral two times a day  piperacillin/tazobactam IVPB.. 3.375 Gram(s) IV Intermittent every 8 hours  potassium chloride   Powder 20 milliEquivalent(s) Oral every 4 hours  sodium chloride 0.9%. 1000 milliLiter(s) (40 mL/Hr) IV Continuous <Continuous>    MEDICATIONS  (PRN):  acetaminophen     Tablet .. 650 milliGRAM(s) Oral every 6 hours PRN Temp greater or equal to 38C (100.4F), Mild Pain (1 - 3)  albuterol/ipratropium for Nebulization 3 milliLiter(s) Nebulizer every 6 hours PRN Shortness of Breath and/or Wheezing  aluminum hydroxide/magnesium hydroxide/simethicone Suspension 30 milliLiter(s) Oral every 4 hours PRN Dyspepsia  melatonin 3 milliGRAM(s) Oral at bedtime PRN Insomnia  ondansetron Injectable 4 milliGRAM(s) IV Push every 8 hours PRN Nausea and/or Vomiting    Vital Signs Last 24 Hrs  T(C): 36.4 (04 May 2022 17:11), Max: 36.5 (04 May 2022 05:00)  T(F): 97.5 (04 May 2022 17:11), Max: 97.7 (04 May 2022 05:00)  HR: 94 (04 May 2022 17:11) (60 - 94)  BP: 103/56 (04 May 2022 17:11) (96/55 - 103/56)  BP(mean): 67 (04 May 2022 05:00) (67 - 74)  RR: 24 (04 May 2022 17:11) (18 - 93)  SpO2: 95% (04 May 2022 17:11) (93% - 98%)    PHYSICAL EXAM:  GENERAL: NAD, well-groomed, well-developed  HEAD:  Atraumatic, Normocephalic  EYES: EOMI, PERRLA, conjunctiva and sclera clear  ENMT: Moist mucous membranes, no lesions  NECK: Supple.  CHEST/LUNG: Clear to auscultation bilaterally; No rales, rhonchi, wheezing, or rubs  HEART: S1, S2.   ABDOMEN: Soft, Nontender, Nondistended; Bowel sounds present  EXTREMITIES:  2+ Peripheral Pulses, No clubbing, cyanosis, or edema  MS: No joint swelling or deformity.   LYMPH: No lymphadenopathy noted  SKIN: No rashes or lesions  NERVOUS SYSTEM:  No focal deficit.   PSYCH:  Awake and alert.   LABS:                         11.0   17.28 )-----------( 295      ( 04 May 2022 05:30 )             33.7     04 May 2022 05:30    140    |  103    |  39     ----------------------------<  117    2.9     |  30     |  0.93     Ca    8.2        04 May 2022 05:30    TPro  6.3    /  Alb  1.6    /  TBili  0.4    /  DBili  x      /  AST  38     /  ALT  33     /  AlkPhos  78     04 May 2022 05:30    CAPILLARY BLOOD GLUCOSE        PT/INR - ( 03 May 2022 15:36 )   PT: 24.5 sec;   INR: 2.11 ratio         PTT - ( 03 May 2022 15:36 )  PTT:30.9 sec     Chol 106 LDL -- HDL 20<L> Trig 147            Procalcitonin, Serum: 0.22 ng/mL (22 @ 07:28)    Urinalysis Basic - ( 03 May 2022 16:06 )    Color: Yellow / Appearance: Clear / S.015 / pH: x  Gluc: x / Ketone: Negative  / Bili: Negative / Urobili: Negative mg/dL   Blood: x / Protein: 15 mg/dL / Nitrite: Negative   Leuk Esterase: Moderate / RBC: 0-2 /HPF / WBC 6-10   Sq Epi: x / Non Sq Epi: Few / Bacteria: Few          RADIOLOGY TEST: (IMAGES REVIEWED BY ME)    Imaging Personally Reviewed:  [X] YES      HEALTH ISSUES - PROBLEM Dx:  Sepsis    Pneumonia    Influenza A    Chronic atrial fibrillation    COPD (chronic obstructive pulmonary disease)    Orthostatic hypotension    Prophylactic measure         Patient is a 89y old  Female who presents with a chief complaint of Shortness of breath, low BP (04 May 2022 12:46)    HPI:  89 years old female with past medical history of atrial fibrillation, COPD, history of pericardial effusion, pleural effusion in past, orthostatic hypotension, dementia, who was noted to have shortness of breath and low blood pressure at SNF. Patient was sent in to ER. In Er, patient is noted to have elevated WBC, Pneumonia on chest x-ray. She is being admitted for further care.     Patient is very poor historian. All information is from patient's chart and NH records.   She denies any chest pain or pressure.   No nausea or vomiting.  (03 May 2022 18:04)    INTERVAL HPI/OVERNIGHT EVENTS:  Chart reviewed, notes reviewed.   Patient seen and examined.  Being followed by following specialists: ID, pulmonary    Consultant(s) Notes Reviewed:  [X] Yes    Care Discussed with Consultants/Other Providers: [X] Yes    2022 -->     REVIEW OF SYSTEMS:  CONSTITUTIONAL: No fever, weight loss, or fatigue  EYES: No eye pain, or discharge  ENMT: No sinus or throat pain  NECK: No pain or stiffness  BREASTS: No pain, masses, or nipple discharge  RESPIRATORY: No cough, wheezing, chills or hemoptysis; No shortness of breath  CARDIOVASCULAR: No chest pain, palpitations, dizziness, or leg swelling  GASTROINTESTINAL: No abdominal or epigastric pain. No nausea, vomiting.  GENITOURINARY: No dysuria, frequency, hematuria, or incontinence  NEUROLOGICAL: No loss of strength, numbness, or tremors  SKIN: No itching, burning, rashes, or lesions   LYMPH NODES: No enlarged glands  ENDOCRINE: No polydipsia or polyuria  MUSCULOSKELETAL: No muscle, back, or extremity pain  PSYCHIATRIC: No depression, anxiety, mood swings.  HEME/LYMPH: No easy bruising, or bleeding gums  ALLERGY AND IMMUNOLOGIC: No hives or eczema    Allergies    No Known Allergies    Intolerances      Home Medications:    MEDICATIONS  (STANDING):  aMIOdarone    Tablet 200 milliGRAM(s) Oral daily  apixaban 2.5 milliGRAM(s) Oral every 12 hours  guaiFENesin  milliGRAM(s) Oral every 12 hours  lactobacillus acidophilus 1 Tablet(s) Oral three times a day with meals  midodrine. 10 milliGRAM(s) Oral three times a day  oseltamivir 30 milliGRAM(s) Oral two times a day  piperacillin/tazobactam IVPB.. 3.375 Gram(s) IV Intermittent every 8 hours  potassium chloride   Powder 20 milliEquivalent(s) Oral every 4 hours  sodium chloride 0.9%. 1000 milliLiter(s) (40 mL/Hr) IV Continuous <Continuous>    MEDICATIONS  (PRN):  acetaminophen     Tablet .. 650 milliGRAM(s) Oral every 6 hours PRN Temp greater or equal to 38C (100.4F), Mild Pain (1 - 3)  albuterol/ipratropium for Nebulization 3 milliLiter(s) Nebulizer every 6 hours PRN Shortness of Breath and/or Wheezing  aluminum hydroxide/magnesium hydroxide/simethicone Suspension 30 milliLiter(s) Oral every 4 hours PRN Dyspepsia  melatonin 3 milliGRAM(s) Oral at bedtime PRN Insomnia  ondansetron Injectable 4 milliGRAM(s) IV Push every 8 hours PRN Nausea and/or Vomiting    Vital Signs Last 24 Hrs  T(C): 36.4 (04 May 2022 17:11), Max: 36.5 (04 May 2022 05:00)  T(F): 97.5 (04 May 2022 17:11), Max: 97.7 (04 May 2022 05:00)  HR: 94 (04 May 2022 17:11) (60 - 94)  BP: 103/56 (04 May 2022 17:11) (96/55 - 103/56)  BP(mean): 67 (04 May 2022 05:00) (67 - 74)  RR: 24 (04 May 2022 17:11) (18 - 93)  SpO2: 95% (04 May 2022 17:11) (93% - 98%)    PHYSICAL EXAM:  GENERAL: NAD, well-groomed, well-developed  HEAD:  Atraumatic, Normocephalic  EYES: EOMI, PERRLA, conjunctiva and sclera clear  ENMT: Moist mucous membranes, no lesions  NECK: Supple.  CHEST/LUNG: Clear to auscultation bilaterally; No rales, rhonchi, wheezing, or rubs  HEART: S1, S2.   ABDOMEN: Soft, Nontender, Nondistended; Bowel sounds present  EXTREMITIES:  2+ Peripheral Pulses, No clubbing, cyanosis, or edema  MS: No joint swelling or deformity.   LYMPH: No lymphadenopathy noted  SKIN: No rashes or lesions  NERVOUS SYSTEM:  No focal deficit.   PSYCH:  Awake and alert.   LABS:                         11.0   17.28 )-----------( 295      ( 04 May 2022 05:30 )             33.7     04 May 2022 05:30    140    |  103    |  39     ----------------------------<  117    2.9     |  30     |  0.93     Ca    8.2        04 May 2022 05:30    TPro  6.3    /  Alb  1.6    /  TBili  0.4    /  DBili  x      /  AST  38     /  ALT  33     /  AlkPhos  78     04 May 2022 05:30    CAPILLARY BLOOD GLUCOSE        PT/INR - ( 03 May 2022 15:36 )   PT: 24.5 sec;   INR: 2.11 ratio         PTT - ( 03 May 2022 15:36 )  PTT:30.9 sec     Chol 106 LDL -- HDL 20<L> Trig 147            Procalcitonin, Serum: 0.22 ng/mL (22 @ 07:28)    Urinalysis Basic - ( 03 May 2022 16:06 )    Color: Yellow / Appearance: Clear / S.015 / pH: x  Gluc: x / Ketone: Negative  / Bili: Negative / Urobili: Negative mg/dL   Blood: x / Protein: 15 mg/dL / Nitrite: Negative   Leuk Esterase: Moderate / RBC: 0-2 /HPF / WBC 6-10   Sq Epi: x / Non Sq Epi: Few / Bacteria: Few          RADIOLOGY TEST: (IMAGES REVIEWED BY ME)    Imaging Personally Reviewed:  [X] YES      HEALTH ISSUES - PROBLEM Dx:  Sepsis    Pneumonia    Influenza A    Chronic atrial fibrillation    COPD (chronic obstructive pulmonary disease)    Orthostatic hypotension    Prophylactic measure         Patient is a 89y old  Female who presents with a chief complaint of Shortness of breath, low BP (04 May 2022 12:46)    HPI:  89 years old female with past medical history of atrial fibrillation, COPD, history of pericardial effusion, pleural effusion in past, orthostatic hypotension, dementia, who was noted to have shortness of breath and low blood pressure at SNF. Patient was sent in to ER. In Er, patient is noted to have elevated WBC, Pneumonia on chest x-ray. She is being admitted for further care.     Patient is very poor historian. All information is from patient's chart and NH records.   She denies any chest pain or pressure.   No nausea or vomiting.  (03 May 2022 18:04)    INTERVAL HPI/OVERNIGHT EVENTS:  Chart reviewed, notes reviewed.   Patient seen and examined.  Being followed by following specialists: ID, pulmonary    Consultant(s) Notes Reviewed:  [X] Yes    Care Discussed with Consultants/Other Providers: [X] Yes    2022 --> Doing slightly better. More awake and alert. Denies any chest pain or pressure. No nausea or vomiting.     REVIEW OF SYSTEMS: ROS is very limited as patient is very poor historian.   CONSTITUTIONAL: + fatigue  EYES: No eye pain, or discharge  ENMT: No sinus or throat pain  NECK: No pain or stiffness  BREASTS: No pain, masses, or nipple discharge  RESPIRATORY: + cough, + shortness of breath  CARDIOVASCULAR: No chest pain, palpitations, dizziness, or leg swelling  GASTROINTESTINAL: No abdominal or epigastric pain. No nausea, vomiting.  GENITOURINARY: No dysuria, frequency, hematuria, or incontinence  NEUROLOGICAL: No loss of strength, numbness, or tremors  SKIN: No itching, burning, rashes, or lesions   LYMPH NODES: No enlarged glands  ENDOCRINE: No polydipsia or polyuria  MUSCULOSKELETAL: No muscle, back, or extremity pain  PSYCHIATRIC: No depression, anxiety, mood swings.  HEME/LYMPH: No easy bruising, or bleeding gums  ALLERGY AND IMMUNOLOGIC: No hives or eczema    Allergies    No Known Allergies    Intolerances      Home Medications:    MEDICATIONS  (STANDING):  aMIOdarone    Tablet 200 milliGRAM(s) Oral daily  apixaban 2.5 milliGRAM(s) Oral every 12 hours  guaiFENesin  milliGRAM(s) Oral every 12 hours  lactobacillus acidophilus 1 Tablet(s) Oral three times a day with meals  midodrine. 10 milliGRAM(s) Oral three times a day  oseltamivir 30 milliGRAM(s) Oral two times a day  piperacillin/tazobactam IVPB.. 3.375 Gram(s) IV Intermittent every 8 hours  potassium chloride   Powder 20 milliEquivalent(s) Oral every 4 hours  sodium chloride 0.9%. 1000 milliLiter(s) (40 mL/Hr) IV Continuous <Continuous>    MEDICATIONS  (PRN):  acetaminophen     Tablet .. 650 milliGRAM(s) Oral every 6 hours PRN Temp greater or equal to 38C (100.4F), Mild Pain (1 - 3)  albuterol/ipratropium for Nebulization 3 milliLiter(s) Nebulizer every 6 hours PRN Shortness of Breath and/or Wheezing  aluminum hydroxide/magnesium hydroxide/simethicone Suspension 30 milliLiter(s) Oral every 4 hours PRN Dyspepsia  melatonin 3 milliGRAM(s) Oral at bedtime PRN Insomnia  ondansetron Injectable 4 milliGRAM(s) IV Push every 8 hours PRN Nausea and/or Vomiting    Vital Signs Last 24 Hrs  T(C): 36.4 (04 May 2022 17:11), Max: 36.5 (04 May 2022 05:00)  T(F): 97.5 (04 May 2022 17:11), Max: 97.7 (04 May 2022 05:00)  HR: 94 (04 May 2022 17:11) (60 - 94)  BP: 103/56 (04 May 2022 17:11) (96/55 - 103/56)  BP(mean): 67 (04 May 2022 05:00) (67 - 74)  RR: 24 (04 May 2022 17:11) (18 - 93)  SpO2: 95% (04 May 2022 17:11) (93% - 98%)    PHYSICAL EXAM:  GENERAL: Elderly female in no acute distress.   HEAD:  Atraumatic, Normocephalic  EYES: Pallor +  ENMT: Moist mucous membranes, no lesions  NECK: Supple.  CHEST/LUNG: Decreased breath sounds at bases, occasional rhonchi + R>L  HEART: S1, S2.   ABDOMEN: Soft, Nontender, Nondistended; Bowel sounds present  EXTREMITIES:  2+ Peripheral Pulses, No clubbing, cyanosis, or edema  MS: No joint swelling or deformity.   LYMPH: No lymphadenopathy noted  SKIN: No rashes or lesions  NERVOUS SYSTEM:  No focal deficit.   PSYCH:  Awake and alert but confused.   LABS:                         11.0   17.28 )-----------( 295      ( 04 May 2022 05:30 )             33.7     04 May 2022 05:30    140    |  103    |  39     ----------------------------<  117    2.9     |  30     |  0.93     Ca    8.2        04 May 2022 05:30    TPro  6.3    /  Alb  1.6    /  TBili  0.4    /  DBili  x      /  AST  38     /  ALT  33     /  AlkPhos  78     04 May 2022 05:30    PT/INR - ( 03 May 2022 15:36 )   PT: 24.5 sec;   INR: 2.11 ratio         PTT - ( 03 May 2022 15:36 )  PTT:30.9 sec     Chol 106 LDL -- HDL 20<L> Trig 147    Procalcitonin, Serum: 0.22 ng/mL (22 @ 07:28)    Urinalysis Basic - ( 03 May 2022 16:06 )    Color: Yellow / Appearance: Clear / S.015 / pH: x  Gluc: x / Ketone: Negative  / Bili: Negative / Urobili: Negative mg/dL   Blood: x / Protein: 15 mg/dL / Nitrite: Negative   Leuk Esterase: Moderate / RBC: 0-2 /HPF / WBC 6-10   Sq Epi: x / Non Sq Epi: Few / Bacteria: Few          RADIOLOGY TEST: (IMAGES REVIEWED BY ME)    Imaging Personally Reviewed:  [X] YES      HEALTH ISSUES - PROBLEM Dx:  Sepsis    Pneumonia    Influenza A    Chronic atrial fibrillation    COPD (chronic obstructive pulmonary disease)    Orthostatic hypotension    Prophylactic measure

## 2022-05-04 NOTE — PROGRESS NOTE ADULT - PROBLEM SELECTOR PLAN 3
Will place on Tamiflu (dose adjusted as per creatinine clearance).   Droplet isolation. Continue Tamiflu (dose adjusted as per creatinine clearance).   Droplet isolation.

## 2022-05-04 NOTE — SWALLOW BEDSIDE ASSESSMENT ADULT - SLP PERTINENT HISTORY OF CURRENT PROBLEM
Per charting, "89 y/o female with PMHx orthostatic hypotension and A FIb BIBA from SNF due to SOB. As per EMS, pt found to be hypoxic in the 70s. As per Dr. harding, reports patient on augmentin for pneumonia and flu outbreak in SNF. pt denies chest pain, SOB, fever, vomiting, abd pain, leg swelling, or any other complaints"

## 2022-05-04 NOTE — CONSULT NOTE ADULT - ASSESSMENT
Elderly pt. with COPD admitted for RLL pneumonia, Influenza.  Suggest continue antibiotics, tamiflu.  FU CXR  DNR  Inhalers.  Replete KMohini

## 2022-05-04 NOTE — PROGRESS NOTE ADULT - PROBLEM SELECTOR PLAN 1
Patient with possible sepsis, POA.   Will continue IV hydration and empiric antibiotics.   Supportive care. Patient with possible sepsis, POA.   Clinically improved.   Continue IVF and antibiotics.

## 2022-05-04 NOTE — PROGRESS NOTE ADULT - PROBLEM SELECTOR PLAN 2
Patient with pneumonia, possible HCAP vs post influenza pneumonia.   Will place on empiric antibiotics.   Concern is for Gram negative organisms.   Follow culture data.   ID and Pulmonary consult. Patient with pneumonia, possible HCAP vs post influenza pneumonia.   Continue Zosyn.   Concern is for Gram negative organisms.   Follow culture data.   ID and Pulmonary follow up.

## 2022-05-04 NOTE — SWALLOW BEDSIDE ASSESSMENT ADULT - COMMENTS
Pt received upright in bed, awake and cooperative. Pt on supplemental O2 via 2L NC with O2 sats trending 93%. RN present at bedside. Pt was agreeable to assessment. Pt followed low level directives with verbal prompts. Pt's vocal quality/intensity was grossly WFL. Pt denied pain pre and post assessment.    CXR 5/3: "Bilateral pneumonia. Question underlying nodular opacity left base. Correlate with chest CT." Pt's WBC is elevated, no fever.

## 2022-05-04 NOTE — SWALLOW BEDSIDE ASSESSMENT ADULT - SWALLOW EVAL: DIAGNOSIS
Pt was administered PO trials of puree, moderately thick liquids, mildly thick liquids, and thin liquids. Pt politely declined trials of solids despite cues of encouragement. Pt p/w a functional oral phase marked by adequate retrieval and containment , timely manipulation and transfer, and adequate clearance post swallow. Pharyngeal dysphagia marked by suspected brief delay in swallow onset, +laryngeal elevation to palpation. Pt p/w intermittent throat clear throughout PO trials, noted to be more consistent as viscosity of PO decreased. RN reported pt also noted to have baseline congestion, ? baseline vs. penetration/aspiration event. Recommend pt resume puree with liquid downgrade to moderately thick liquids via single/small cup sips +aspiration precautions. Given diagnosis of pna and swallow presentation, recommend MBS for objective assessment and determine safest/least restrictive PO diet. Discussed with RN, called out to MD.

## 2022-05-05 DIAGNOSIS — E87.6 HYPOKALEMIA: ICD-10-CM

## 2022-05-05 DIAGNOSIS — R13.10 DYSPHAGIA, UNSPECIFIED: ICD-10-CM

## 2022-05-05 DIAGNOSIS — N39.0 URINARY TRACT INFECTION, SITE NOT SPECIFIED: ICD-10-CM

## 2022-05-05 LAB
ALBUMIN SERPL ELPH-MCNC: 1.8 G/DL — LOW (ref 3.3–5)
ALP SERPL-CCNC: 90 U/L — SIGNIFICANT CHANGE UP (ref 30–120)
ALT FLD-CCNC: 32 U/L DA — SIGNIFICANT CHANGE UP (ref 10–60)
ANION GAP SERPL CALC-SCNC: 5 MMOL/L — SIGNIFICANT CHANGE UP (ref 5–17)
AST SERPL-CCNC: 37 U/L — SIGNIFICANT CHANGE UP (ref 10–40)
BILIRUB SERPL-MCNC: 0.3 MG/DL — SIGNIFICANT CHANGE UP (ref 0.2–1.2)
BUN SERPL-MCNC: 22 MG/DL — SIGNIFICANT CHANGE UP (ref 7–23)
CALCIUM SERPL-MCNC: 8.6 MG/DL — SIGNIFICANT CHANGE UP (ref 8.4–10.5)
CHLORIDE SERPL-SCNC: 104 MMOL/L — SIGNIFICANT CHANGE UP (ref 96–108)
CO2 SERPL-SCNC: 31 MMOL/L — SIGNIFICANT CHANGE UP (ref 22–31)
CREAT SERPL-MCNC: 0.7 MG/DL — SIGNIFICANT CHANGE UP (ref 0.5–1.3)
EGFR: 83 ML/MIN/1.73M2 — SIGNIFICANT CHANGE UP
GLUCOSE SERPL-MCNC: 99 MG/DL — SIGNIFICANT CHANGE UP (ref 70–99)
HCT VFR BLD CALC: 37.5 % — SIGNIFICANT CHANGE UP (ref 34.5–45)
HGB BLD-MCNC: 11.6 G/DL — SIGNIFICANT CHANGE UP (ref 11.5–15.5)
MAGNESIUM SERPL-MCNC: 2 MG/DL — SIGNIFICANT CHANGE UP (ref 1.6–2.6)
MCHC RBC-ENTMCNC: 30.6 PG — SIGNIFICANT CHANGE UP (ref 27–34)
MCHC RBC-ENTMCNC: 30.9 GM/DL — LOW (ref 32–36)
MCV RBC AUTO: 98.9 FL — SIGNIFICANT CHANGE UP (ref 80–100)
NRBC # BLD: 0 /100 WBCS — SIGNIFICANT CHANGE UP (ref 0–0)
PLATELET # BLD AUTO: 344 K/UL — SIGNIFICANT CHANGE UP (ref 150–400)
POTASSIUM SERPL-MCNC: 3.4 MMOL/L — LOW (ref 3.5–5.3)
POTASSIUM SERPL-SCNC: 3.4 MMOL/L — LOW (ref 3.5–5.3)
PROT SERPL-MCNC: 6.8 G/DL — SIGNIFICANT CHANGE UP (ref 6–8.3)
RBC # BLD: 3.79 M/UL — LOW (ref 3.8–5.2)
RBC # FLD: 15 % — HIGH (ref 10.3–14.5)
SODIUM SERPL-SCNC: 140 MMOL/L — SIGNIFICANT CHANGE UP (ref 135–145)
WBC # BLD: 14.7 K/UL — HIGH (ref 3.8–10.5)
WBC # FLD AUTO: 14.7 K/UL — HIGH (ref 3.8–10.5)

## 2022-05-05 PROCEDURE — 93010 ELECTROCARDIOGRAM REPORT: CPT

## 2022-05-05 PROCEDURE — 71045 X-RAY EXAM CHEST 1 VIEW: CPT | Mod: 26

## 2022-05-05 PROCEDURE — 74230 X-RAY XM SWLNG FUNCJ C+: CPT | Mod: 26

## 2022-05-05 RX ADMIN — APIXABAN 2.5 MILLIGRAM(S): 2.5 TABLET, FILM COATED ORAL at 18:59

## 2022-05-05 RX ADMIN — APIXABAN 2.5 MILLIGRAM(S): 2.5 TABLET, FILM COATED ORAL at 06:34

## 2022-05-05 RX ADMIN — Medication 1 TABLET(S): at 18:59

## 2022-05-05 RX ADMIN — PIPERACILLIN AND TAZOBACTAM 25 GRAM(S): 4; .5 INJECTION, POWDER, LYOPHILIZED, FOR SOLUTION INTRAVENOUS at 06:35

## 2022-05-05 RX ADMIN — AMIODARONE HYDROCHLORIDE 200 MILLIGRAM(S): 400 TABLET ORAL at 06:34

## 2022-05-05 RX ADMIN — Medication 30 MILLIGRAM(S): at 18:59

## 2022-05-05 RX ADMIN — MIDODRINE HYDROCHLORIDE 10 MILLIGRAM(S): 2.5 TABLET ORAL at 21:56

## 2022-05-05 RX ADMIN — Medication 1 TABLET(S): at 13:38

## 2022-05-05 RX ADMIN — Medication 600 MILLIGRAM(S): at 06:35

## 2022-05-05 RX ADMIN — PIPERACILLIN AND TAZOBACTAM 25 GRAM(S): 4; .5 INJECTION, POWDER, LYOPHILIZED, FOR SOLUTION INTRAVENOUS at 21:56

## 2022-05-05 RX ADMIN — Medication 30 MILLIGRAM(S): at 06:35

## 2022-05-05 RX ADMIN — MIDODRINE HYDROCHLORIDE 10 MILLIGRAM(S): 2.5 TABLET ORAL at 06:34

## 2022-05-05 RX ADMIN — Medication 600 MILLIGRAM(S): at 18:59

## 2022-05-05 RX ADMIN — MIDODRINE HYDROCHLORIDE 10 MILLIGRAM(S): 2.5 TABLET ORAL at 13:38

## 2022-05-05 RX ADMIN — PIPERACILLIN AND TAZOBACTAM 25 GRAM(S): 4; .5 INJECTION, POWDER, LYOPHILIZED, FOR SOLUTION INTRAVENOUS at 15:15

## 2022-05-05 NOTE — PROGRESS NOTE ADULT - SUBJECTIVE AND OBJECTIVE BOX
PULMONARY/CRITICAL CARE  DOS 22  Doing better. No fever. Sats ok. Some cough nonprod.     Pt. is poor historian.  Patient is a 89y old  Female who presents with a chief complaint of Shortness of breath, low BP (03 May 2022 18:04)/ right pneumonia.    BRIEF HOSPITAL COURSE: ***· Chief Complaint:  89y Female complaining of shortness of breath.  · HPI Objective Statement: 89 y/o female with PMHx orthostatic hypotension and A FIb BIBA from SNF due to SOB. As per EMS, pt found to be hypoxic in the 70s. As per Dr. harding, reports patient on augmentin for pneumonia and flu outbreak in SNF. pt denies chest pain, SOB, fever, vomiting, abd pain, leg swelling, or any other complaints.  She claims she has had influenza vaccine.     Events last 24 hours: ***    PAST MEDICAL & SURGICAL HISTORY:  Chronic atrial fibrillation    Orthostatic hypotension    COPD (chronic obstructive pulmonary disease)    Pericardial effusion    Pleural effusion    No significant past surgical history      Allergies    No Known Allergies    Intolerances      FAMILY HISTORY/ social: denies cigs, etoh          Medications:  oseltamivir 30 milliGRAM(s) Oral two times a day  piperacillin/tazobactam IVPB.. 3.375 Gram(s) IV Intermittent every 8 hours    aMIOdarone    Tablet 200 milliGRAM(s) Oral daily  midodrine. 10 milliGRAM(s) Oral three times a day    albuterol/ipratropium for Nebulization 3 milliLiter(s) Nebulizer every 6 hours PRN  guaiFENesin  milliGRAM(s) Oral every 12 hours    acetaminophen     Tablet .. 650 milliGRAM(s) Oral every 6 hours PRN  melatonin 3 milliGRAM(s) Oral at bedtime PRN  ondansetron Injectable 4 milliGRAM(s) IV Push every 8 hours PRN      apixaban 2.5 milliGRAM(s) Oral every 12 hours    aluminum hydroxide/magnesium hydroxide/simethicone Suspension 30 milliLiter(s) Oral every 4 hours PRN        potassium chloride  20 mEq/100 mL IVPB 20 milliEquivalent(s) IV Intermittent every 2 hours  sodium chloride 0.9%. 1000 milliLiter(s) IV Continuous <Continuous>        lactobacillus acidophilus 1 Tablet(s) Oral three times a day with meals          ICU Vital Signs Last 24 Hrs  T(C): 36.5 (04 May 2022 05:00), Max: 37.8 (03 May 2022 14:55)  T(F): 97.7 (04 May 2022 05:00), Max: 100 (03 May 2022 14:55)  HR: 62 (04 May 2022 05:00) (60 - 72)  BP: 97/54 (04 May 2022 05:00) (91/55 - 113/58)  BP(mean): 67 (04 May 2022 05:00) (67 - 74)  ABP: --  ABP(mean): --  RR: 18 (04 May 2022 05:00) (18 - 22)  SpO2: 96% (04 May 2022 05:00) (90% - 98%)    Vital Signs Last 24 Hrs  T(C): 36.5 (04 May 2022 05:00), Max: 37.8 (03 May 2022 14:55)  T(F): 97.7 (04 May 2022 05:00), Max: 100 (03 May 2022 14:55)  HR: 62 (04 May 2022 05:00) (60 - 72)  BP: 97/54 (04 May 2022 05:00) (91/55 - 113/58)  BP(mean): 67 (04 May 2022 05:00) (67 - 74)  RR: 18 (04 May 2022 05:00) (18 - 22)  SpO2: 96% (04 May 2022 05:00) (90% - 98%)        I&O's Detail        LABS:                        11.0   17.28 )-----------( 295      ( 04 May 2022 05:30 )             33.7     05-04    140  |  103  |  39<H>  ----------------------------<  117<H>  2.9<LL>   |  30  |  0.93    Ca    8.2<L>      04 May 2022 05:30    TPro  6.3  /  Alb  1.6<L>  /  TBili  0.4  /  DBili  x   /  AST  38  /  ALT  33  /  AlkPhos  78  05-04          CAPILLARY BLOOD GLUCOSE        PT/INR - ( 03 May 2022 15:36 )   PT: 24.5 sec;   INR: 2.11 ratio         PTT - ( 03 May 2022 15:36 )  PTT:30.9 sec  Urinalysis Basic - ( 03 May 2022 16:06 )    Color: Yellow / Appearance: Clear / S.015 / pH: x  Gluc: x / Ketone: Negative  / Bili: Negative / Urobili: Negative mg/dL   Blood: x / Protein: 15 mg/dL / Nitrite: Negative   Leuk Esterase: Moderate / RBC: 0-2 /HPF / WBC 6-10   Sq Epi: x / Non Sq Epi: Few / Bacteria: Few      CULTURES:      Physical Examination:    General: No acute distress.  thin elderly female sitting up comfortably    HEENT: Pupils equal, reactive to light.  Symmetric. poor dentition    PULM: Clear to auscultation bilaterally, no wheeze rhonchi rales no change percussion    CVS: irregular rate and rhythm, no murmurs, rubs, or gallops    ABD: Soft, nondistended, nontender, normoactive bowel sounds, no masses    EXT: No edema, nontender calves    SKIN: Warm and well perfused, no rashes noted.    NEURO: Alert, somewhat confused, interactive, nonfocal    RADIOLOGY: ***CXR RLL / left base infiltrae    CRITICAL CARE TIME SPENT: ***

## 2022-05-05 NOTE — SWALLOW VFSS/MBS ASSESSMENT ADULT - COMMENTS
Chart reviewed order received for MBS.  Pt received as above.  MBS completed see below for details.  Pt left with Radiology staff awaiting transport to unit, KELSI Elena notified, call out to MD.  Will follow for trial dysphagia tx, as schedule permits.    Per charting, "88 y/o female with PMHx orthostatic hypotension and A FIb BIBA from SNF due to SOB. As per EMS, pt found to be hypoxic in the 70s. As per Dr. harding, reports patient on augmentin for pneumonia and flu outbreak in SNF. pt denies chest pain, SOB, fever, vomiting, abd pain, leg swelling, or any other complaints"    CXR 5/3: "Bilateral pneumonia. Question underlying nodular opacity left base. Correlate with chest CT."

## 2022-05-05 NOTE — PROGRESS NOTE ADULT - PROBLEM SELECTOR PLAN 2
Patient with pneumonia, possible HCAP vs post influenza pneumonia.   Continue Zosyn.   Concern is for Gram negative organisms.   Follow culture data.   ID and Pulmonary follow up.

## 2022-05-05 NOTE — SWALLOW VFSS/MBS ASSESSMENT ADULT - ADDITIONAL INFORMATION
Three black appearing dot like marks noted in area of trachea prior to initiation of MBS and observed to move upon swallowing throughout MBS.

## 2022-05-05 NOTE — PROGRESS NOTE ADULT - SUBJECTIVE AND OBJECTIVE BOX
Patient is a 89y old  Female who presents with a chief complaint of Shortness of breath, low BP (04 May 2022 12:46)    HPI:  89 years old female with past medical history of atrial fibrillation, COPD, history of pericardial effusion, pleural effusion in past, orthostatic hypotension, dementia, who was noted to have shortness of breath and low blood pressure at SNF. Patient was sent in to ER. In Er, patient is noted to have elevated WBC, Pneumonia on chest x-ray. She is being admitted for further care.     Patient is very poor historian. All information is from patient's chart and NH records.   She denies any chest pain or pressure.   No nausea or vomiting.  (03 May 2022 18:04)    INTERVAL HPI/OVERNIGHT EVENTS:  Chart reviewed, notes reviewed.   Patient seen and examined.  Being followed by following specialists: ID, pulmonary    Consultant(s) Notes Reviewed:  [X] Yes    Care Discussed with Consultants/Other Providers: [X] Yes    05/04/2022 --> Doing slightly better. More awake and alert. Denies any chest pain or pressure. No nausea or vomiting.    05/05/2022 --> Improving slowly. No shortness of breath on rest. No chest pain or pressure. No fever or chills.      REVIEW OF SYSTEMS: ROS is very limited as patient is very poor historian.   CONSTITUTIONAL: + fatigue  EYES: No eye pain, or discharge  ENMT: No sinus or throat pain  NECK: No pain or stiffness  BREASTS: No pain, masses, or nipple discharge  RESPIRATORY: + cough, + shortness of breath  CARDIOVASCULAR: No chest pain, palpitations, dizziness, or leg swelling  GASTROINTESTINAL: No abdominal or epigastric pain. No nausea, vomiting.  GENITOURINARY: No dysuria, frequency, hematuria, or incontinence  NEUROLOGICAL: No loss of strength, numbness, or tremors  SKIN: No itching, burning, rashes, or lesions   LYMPH NODES: No enlarged glands  ENDOCRINE: No polydipsia or polyuria  MUSCULOSKELETAL: No muscle, back, or extremity pain  PSYCHIATRIC: No depression, anxiety, mood swings.  HEME/LYMPH: No easy bruising, or bleeding gums  ALLERGY AND IMMUNOLOGIC: No hives or eczema    Allergies    No Known Allergies    Intolerances      Home Medications:    MEDICATIONS  (STANDING):  aMIOdarone    Tablet 200 milliGRAM(s) Oral daily  apixaban 2.5 milliGRAM(s) Oral every 12 hours  guaiFENesin  milliGRAM(s) Oral every 12 hours  lactobacillus acidophilus 1 Tablet(s) Oral three times a day with meals  midodrine. 10 milliGRAM(s) Oral three times a day  oseltamivir 30 milliGRAM(s) Oral two times a day  piperacillin/tazobactam IVPB.. 3.375 Gram(s) IV Intermittent every 8 hours    MEDICATIONS  (PRN):  acetaminophen     Tablet .. 650 milliGRAM(s) Oral every 6 hours PRN Temp greater or equal to 38C (100.4F), Mild Pain (1 - 3)  albuterol/ipratropium for Nebulization 3 milliLiter(s) Nebulizer every 6 hours PRN Shortness of Breath and/or Wheezing  aluminum hydroxide/magnesium hydroxide/simethicone Suspension 30 milliLiter(s) Oral every 4 hours PRN Dyspepsia  melatonin 3 milliGRAM(s) Oral at bedtime PRN Insomnia  ondansetron Injectable 4 milliGRAM(s) IV Push every 8 hours PRN Nausea and/or Vomiting    Vital Signs Last 24 Hrs  T(C): 36.3 (05 May 2022 08:45), Max: 36.7 (04 May 2022 22:45)  T(F): 97.4 (05 May 2022 08:45), Max: 98 (04 May 2022 22:45)  HR: 66 (05 May 2022 08:45) (63 - 94)  BP: 129/89 (05 May 2022 08:45) (103/56 - 129/89)  BP(mean): --  RR: 19 (05 May 2022 08:45) (19 - 24)  SpO2: 94% (05 May 2022 08:45) (93% - 98%)    PHYSICAL EXAM:  GENERAL: Elderly female in no acute distress.   HEAD:  Atraumatic, Normocephalic  EYES: Pallor +  ENMT: Moist mucous membranes, no lesions  NECK: Supple.  CHEST/LUNG: Decreased breath sounds at bases, occasional rhonchi + R>L  HEART: S1, S2.   ABDOMEN: Soft, Nontender, Nondistended; Bowel sounds present  EXTREMITIES:  2+ Peripheral Pulses, No clubbing, cyanosis, or edema  MS: No joint swelling or deformity.   LYMPH: No lymphadenopathy noted  SKIN: No rashes or lesions  NERVOUS SYSTEM:  No focal deficit.   PSYCH:  Awake and alert but confused.   LABS:                         11.6   14.70 )-----------( 344      ( 05 May 2022 06:00 )             37.5     05 May 2022 06:00    140    |  104    |  22     ----------------------------<  99     3.4     |  31     |  0.70     Ca    8.6        05 May 2022 06:00  Mg     2.0       05 May 2022 06:00    TPro  6.8    /  Alb  1.8    /  TBili  0.3    /  DBili  x      /  AST  37     /  ALT  32     /  AlkPhos  90     05 May 2022 06:00    PT/INR - ( 03 May 2022 15:36 )   PT: 24.5 sec;   INR: 2.11 ratio      PTT - ( 03 May 2022 15:36 )  PTT:30.9 sec    05-04 Chol 106 LDL -- HDL 20<L> Trig 147    Culture Results:   >100,000 CFU/ml Escherichia coli (05-03 @ 16:06)  Culture Results:   No growth to date. (05-03 @ 15:36)  Culture Results:   No growth to date. (05-03 @ 15:36)    Procalcitonin, Serum: 0.22 ng/mL (05-04-22 @ 07:28)    RADIOLOGY TEST: (IMAGES REVIEWED BY ME)    Imaging Personally Reviewed:  [X] YES      HEALTH ISSUES - PROBLEM Dx:  Sepsis    Pneumonia    Influenza A    Chronic atrial fibrillation    COPD (chronic obstructive pulmonary disease)    Orthostatic hypotension    Prophylactic measure

## 2022-05-05 NOTE — DIETITIAN INITIAL EVALUATION ADULT - NUTRITIONGOAL OUTCOME1
Pt to tolerate diet consistency with >50-75% of most meals/supplements; deter addtl weight loss; improved skin integrity.

## 2022-05-05 NOTE — DIETITIAN INITIAL EVALUATION ADULT - PERTINENT MEDS FT
MEDICATIONS  (STANDING):  aMIOdarone    Tablet 200 milliGRAM(s) Oral daily  apixaban 2.5 milliGRAM(s) Oral every 12 hours  guaiFENesin  milliGRAM(s) Oral every 12 hours  lactobacillus acidophilus 1 Tablet(s) Oral three times a day with meals  midodrine. 10 milliGRAM(s) Oral three times a day  oseltamivir 30 milliGRAM(s) Oral two times a day  piperacillin/tazobactam IVPB.. 3.375 Gram(s) IV Intermittent every 8 hours    MEDICATIONS  (PRN):  acetaminophen     Tablet .. 650 milliGRAM(s) Oral every 6 hours PRN Temp greater or equal to 38C (100.4F), Mild Pain (1 - 3)  albuterol/ipratropium for Nebulization 3 milliLiter(s) Nebulizer every 6 hours PRN Shortness of Breath and/or Wheezing  aluminum hydroxide/magnesium hydroxide/simethicone Suspension 30 milliLiter(s) Oral every 4 hours PRN Dyspepsia  melatonin 3 milliGRAM(s) Oral at bedtime PRN Insomnia  ondansetron Injectable 4 milliGRAM(s) IV Push every 8 hours PRN Nausea and/or Vomiting

## 2022-05-05 NOTE — DIETITIAN NUTRITION RISK NOTIFICATION - TREATMENT: THE FOLLOWING DIET HAS BEEN RECOMMENDED
Diet, Pureed:   Moderately Thick Liquids (MODTHICKLIQS)  Supplement Feeding Modality:  Oral  Ensure Enlive Cans or Servings Per Day:  1       Frequency:  Two Times a day  Ensure Pudding Cans or Servings Per Day:  1       Frequency:  Daily (05-05-22 @ 15:47) [Pending Verification By Attending]  Diet, Pureed:   Moderately Thick Liquids (MODTHICKLIQS)  Supplement Feeding Modality:  Oral  Ensure Pudding Cans or Servings Per Day:  1       Frequency:  Two Times a day (05-04-22 @ 10:34) [Active]

## 2022-05-05 NOTE — DIETITIAN INITIAL EVALUATION ADULT - NS FNS DIET ORDER
Diet, Pureed:   Moderately Thick Liquids (MODTHICKLIQS)  Supplement Feeding Modality:  Oral  Ensure Pudding Cans or Servings Per Day:  1       Frequency:  Two Times a day (05-04-22 @ 10:34) [Active]

## 2022-05-05 NOTE — SWALLOW VFSS/MBS ASSESSMENT ADULT - PHARYNGEAL PHASE COMMENTS
+Coughing in between trials, however, no contrast observed in airway. +Coughing in between trials, however, not after episode of penetration.

## 2022-05-05 NOTE — SWALLOW VFSS/MBS ASSESSMENT ADULT - DIAGNOSTIC IMPRESSIONS
Oral stage functional for puree, moderately thick, mildly thick and thin liquids.  Mild oral dysphagia with soft & bite sized marked by prolonged mastication time due to edentulous dentition, increased oral transit time.  Pharyngeal stage WFL for puree, no stasis, no penetration, no aspiration observed.  Mild pharyngeal dysphagia with soft & bite sized and moderately thick liquids, marked by reduced tongue base retraction, +trace stasis along tongue base and in valleculae, no penetration, no aspiration observed.  Mild-moderate pharyngeal dysphagia with mildly thick and thin liquids marked by reduced tongue base retraction, +trace stasis along tongue base and in valleculae, reduced laryngeal elevation, reduced airway closure, +silent penetration above the vocal folds without clearance.  Attempted postures, however, pt unable to maintain positioning.  It should be noted that pt with frequent moving throughout study, requiring max cues to maintain positioning.  In addition, +coughing in between all PO trials, however, not observed to be related to PO as no penetration/aspiration with puree, soft & bite sized and moderately thick liquids and episodes of penetration with mildly thick and thin liquids were silent.  Suspect osteophyte at ~C4-C5 and suspect cricopharyngeal bar at ~C5-C6, however, not observed to impact swallow function.

## 2022-05-05 NOTE — DIETITIAN INITIAL EVALUATION ADULT - ORAL INTAKE PTA/DIET HISTORY
Pt admitted from [facility]. Reviewed transfer documents, pt on minced consistency, regular diet with thin liquids (allowed soft desserts, breads, and sandwiches). Was receiving ensure TID.

## 2022-05-05 NOTE — SWALLOW VFSS/MBS ASSESSMENT ADULT - SLP GENERAL OBSERVATIONS
Pt received in Radiology upright on Cine chair, A&A, on RA, SpO2 98% throughout MBS, reduced cognition, pt consistently moving requiring max cues to maintain positioning, +baseline congested cough throughout study, pain scale 0/10 pre & post MBS

## 2022-05-05 NOTE — DIETITIAN INITIAL EVALUATION ADULT - PERTINENT LABORATORY DATA
05-05    140  |  104  |  22  ----------------------------<  99  3.4<L>   |  31  |  0.70    Ca    8.6      05 May 2022 06:00  Mg     2.0     05-05    TPro  6.8  /  Alb  1.8<L>  /  TBili  0.3  /  DBili  x   /  AST  37  /  ALT  32  /  AlkPhos  90  05-05  A1C with Estimated Average Glucose Result: 5.8 % (05-04-22 @ 05:30)

## 2022-05-05 NOTE — DIETITIAN NUTRITION RISK NOTIFICATION - ADDITIONAL COMMENTS/DIETITIAN RECOMMENDATIONS
Will provide ensure enlive BID for addtl kcal/protein and to supplement suspected variable po intake (350kcal, 20g protein per 8 fl oz). Recommend encouragement and assistance at meal times. RD to follow-up and will continue to monitor pt's nutritional status.

## 2022-05-05 NOTE — PROGRESS NOTE ADULT - PROBLEM SELECTOR PLAN 1
Patient with possible sepsis, POA. Most likely due to pneumonia and UTI.   Clinically improved.   Continue antibiotics.  Will stop IVF.

## 2022-05-05 NOTE — PROGRESS NOTE ADULT - SUBJECTIVE AND OBJECTIVE BOX
SHAZIA PERDOMO is a 89yFemale , patient examined and chart reviewed. Patient seen and examined today being followed for       INTERVAL HPI/ OVERNIGHT EVENTS:   Afebrile. No events.    PAST MEDICAL & SURGICAL HISTORY:  Chronic atrial fibrillation  Orthostatic hypotension  COPD (chronic obstructive pulmonary disease)  Pericardial effusion  Pleural effusion  No significant past surgical history      For details regarding the patient's social history, family history, and other miscellaneous elements, please refer the initial infectious diseases consultation and/or the admitting history and physical examination for this admission.    ROS:  Unable to obtain due to : Dementia    Current inpatient medications :    ANTIBIOTICS/RELEVANT:  lactobacillus acidophilus 1 Tablet(s) Oral three times a day with meals  oseltamivir 30 milliGRAM(s) Oral two times a day  piperacillin/tazobactam IVPB.. 3.375 Gram(s) IV Intermittent every 8 hours      acetaminophen     Tablet .. 650 milliGRAM(s) Oral every 6 hours PRN  albuterol/ipratropium for Nebulization 3 milliLiter(s) Nebulizer every 6 hours PRN  aluminum hydroxide/magnesium hydroxide/simethicone Suspension 30 milliLiter(s) Oral every 4 hours PRN  aMIOdarone    Tablet 200 milliGRAM(s) Oral daily  apixaban 2.5 milliGRAM(s) Oral every 12 hours  guaiFENesin  milliGRAM(s) Oral every 12 hours  melatonin 3 milliGRAM(s) Oral at bedtime PRN  midodrine. 10 milliGRAM(s) Oral three times a day  ondansetron Injectable 4 milliGRAM(s) IV Push every 8 hours PRN      Objective:     @ 07:01  -   @ 14:02  --------------------------------------------------------  IN: 450 mL / OUT: 0 mL / NET: 450 mL      T(C): 36.8 (22 @ 12:30), Max: 36.8 (22 @ 12:30)  HR: 65 (22 @ 12:30) (63 - 94)  BP: 110/62 (22 @ 12:30) (103/56 - 129/89)  RR: 19 (22 @ 12:30) (19 - 24)  SpO2: 93% (22 @ 12:30) (93% - 98%)      Physical Exam:  General: no acute distress  Neck: supple, trachea midline  Lungs: Decreased, no wheeze/rhonchi  Cardiovascular: regular rate and rhythm, S1 S2  Abdomen: soft, nontender,  bowel sounds normal  Neurological: awake confused  Skin: no rash  Extremities: no edema      LABS:                        11.6   14.70 )-----------( 344      ( 05 May 2022 06:00 )             37.5       05-05    140  |  104  |  22  ----------------------------<  99  3.4<L>   |  31  |  0.70    Ca    8.6      05 May 2022 06:00  Mg     2.0     05-05    TPro  6.8  /  Alb  1.8<L>  /  TBili  0.3  /  DBili  x   /  AST  37  /  ALT  32  /  AlkPhos  90  05-05      PT/INR - ( 03 May 2022 15:36 )   PT: 24.5 sec;   INR: 2.11 ratio         PTT - ( 03 May 2022 15:36 )  PTT:30.9 sec  Urinalysis Basic - ( 03 May 2022 16:06 )    Color: Yellow / Appearance: Clear / S.015 / pH: x  Gluc: x / Ketone: Negative  / Bili: Negative / Urobili: Negative mg/dL   Blood: x / Protein: 15 mg/dL / Nitrite: Negative   Leuk Esterase: Moderate / RBC: 0-2 /HPF / WBC 6-10   Sq Epi: x / Non Sq Epi: Few / Bacteria: Few    MICROBIOLOGY:    Culture - Urine (collected 03 May 2022 16:06)  Source: Clean Catch Clean Catch (Midstream)  Preliminary Report (04 May 2022 21:39):    >100,000 CFU/ml Escherichia coli    Culture - Blood (collected 03 May 2022 15:36)  Source: .Blood Blood-Peripheral  Preliminary Report (04 May 2022 23:01):    No growth to date.    Culture - Blood (collected 03 May 2022 15:36)  Source: .Blood Blood-Peripheral  Preliminary Report (04 May 2022 23:01):    No growth to date.      Respiratory Viral Panel with COVID-19 by ALEJANDRA (22 @ 15:36)    Rapid RVP Result: Detected    SARS-CoV-2: NotDetec: This Respiratory Panel uses polymerase chain reaction (PCR) to detect for  adenovirus; coronavirus (HKU1, NL63, 229E, OC43); human metapneumovirus  (hMPV); human enterovirus/rhinovirus (Entero/RV); influenza A; influenza  A/H1; influenza A/H3; influenza A/H1-2009; influenza B; parainfluenza  viruses 1, 2, 3, 4; respiratory syncytial virus; Mycoplasma pneumoniae;  Chlamydophila pneumoniae; and SARS-CoV-2.    Adenovirus (RapRVP): NotDetec    Influenza A (RapRVP): NotDetec    Influenza AH1  (RapRVP): NotDetec    Influenza AH1 (RapRVP): NotDetec    Influenza AH3 (RapRVP): Detected    Influenza B (RapRVP): NotDetec    Parainfluenza 1 (RapRVP): NotDetec    Parainfluenza 2 (RapRVP): NotDetec    Parainfluenza 3 (RapRVP): NotDetec    Parainfluenza 4 (RapRVP): NotDetec    Chlamydia pneumoniae (RapRVP): NotDetec    Mycoplasma pneumoniae (RapRVP): NotDetec    Entero/Rhinovirus (RapRVP): NotDetec    hMPV (RapRVP): NotDetec    Coronavirus (229E,HKU1,NL63,OC43): NotDetec    RADIOLOGY & ADDITIONAL STUDIES:    ACC: [id number] EXAM:  XR CHEST PORTABLE URGENT 1V                          PROCEDURE DATE:  2022          INTERPRETATION:  Sepsis.    AP chest.    Normal heart size. Atherosclerotic aorta. Hyperinflated lungs. Airspace   infiltrates in the right mid and lower lung field and at the left base   consistent with bilateral pneumonia. Trace bilateral pleural effusions.   Question underlying nodular opacity left base. Correlate with chest CT.   Calcified granuloma right apex. Biapical pleural thickening.    IMPRESSION: Bilateral pneumonia. Question underlying nodular opacity left   base. Correlate with chest CT.    Assessment :   88YO F PMH atrial fibrillation, COPD, history of pericardial effusion, pleural effusion in past, orthostatic hypotension, dementia, presented from [facility] admitted with Influenza A with ovidio pneumonia. - probable superimposed bacterial pneumonia.   Asymptomatic bacteruria  WBC downtrending    Plan :   Tamiflu x 5 days  Cont Zosyn  Fu cultures  Fu MBS   Get legionella and pneumococcal antigen  Trend temps and cbc  Asp precautions      Continue with present regiment.  Appropriate use of antibiotics and adverse effects reviewed.    > 35 minutes were spent in direct patient care reviewing notes, medications ,labs data/ imaging , discussion with multidisciplinary team.    Thank you for allowing me to participate in care of your patient .    Karthikeyan Dai MD  Infectious Disease  [phone]

## 2022-05-05 NOTE — DIETITIAN INITIAL EVALUATION ADULT - OTHER INFO
Per H&P, pt is a "89 years old female with past medical history of atrial fibrillation, COPD, history of pericardial effusion, pleural effusion in past, orthostatic hypotension, dementia, who was noted to have shortness of breath and low blood pressure at SNF. Patient was sent in to ER. In Er, patient is noted to have elevated WBC, Pneumonia on chest x-ray. She is being admitted for further care. Patient is very poor historian. All information is from patient's chart and NH records. She denies any chest pain or pressure. No nausea or vomiting."    Nutrition consult ordered for PI stage II or >. Pt also seen for BMI <19. Pt admitted with stage II pressure ulcer to R buttocks. Visited pt this afternoon, pt resting in bed during time of visit. Pt reports poor appetite. PO intakes 0-25% per RN documentation. Encouraged po intake of meals/fluids/supplements during time of visit. Pt seen by SLP yesterday to assess swallow function- recommended puree with moderately thick liquids via single/small cup sips +aspiration precautions. MBSS completed today- recommended to continue Puree diet with Moderately thick liquids, as tolerated. Diet order currently active for recommended texture/consistency. Pt also receiving ensure pudding BID per MD order. NKFA. No N/V/D/C per chart review. +BM 5/5. CBW on admission 104#. Transfer ht/wt of 69in/105.6#. No edema noted. Pt underweight in appearance, BMI of 15.5; NFPE completed. Pt meets criteria for severe malnutrition in the context of chronic illness (based on BMI <19, mod/severe loss of subcutaneous fat and muscle loss, increased energy/protein needs- wound healing and PNA). Will provide ensure enlive BID for addtl kcal/protein and to supplement suspected variable po intake (350kcal, 20g protein per 8 fl oz). Recommend encouragement and assistance at meal times. RD to follow-up and will continue to monitor pt's nutritional status.

## 2022-05-05 NOTE — DIETITIAN INITIAL EVALUATION ADULT - NSFNSPHYEXAMSKINFT_GEN_A_CORE
Pressure Injury 1: Right:,buttock, Stage II  Pressure Injury 2: none, none  Pressure Injury 3: none, none  Pressure Injury 4: none, none  Pressure Injury 5: none, none  Pressure Injury 6: none, none  Pressure Injury 7: none, none  Pressure Injury 8: none, none  Pressure Injury 9: none, none  Pressure Injury 10: none, none  Pressure Injury 11: none, none

## 2022-05-06 DIAGNOSIS — E43 UNSPECIFIED SEVERE PROTEIN-CALORIE MALNUTRITION: ICD-10-CM

## 2022-05-06 LAB
-  AMIKACIN: SIGNIFICANT CHANGE UP
-  AMOXICILLIN/CLAVULANIC ACID: SIGNIFICANT CHANGE UP
-  AMPICILLIN/SULBACTAM: SIGNIFICANT CHANGE UP
-  AMPICILLIN: SIGNIFICANT CHANGE UP
-  AZTREONAM: SIGNIFICANT CHANGE UP
-  CEFAZOLIN: SIGNIFICANT CHANGE UP
-  CEFEPIME: SIGNIFICANT CHANGE UP
-  CEFOXITIN: SIGNIFICANT CHANGE UP
-  CEFTRIAXONE: SIGNIFICANT CHANGE UP
-  CIPROFLOXACIN: SIGNIFICANT CHANGE UP
-  ERTAPENEM: SIGNIFICANT CHANGE UP
-  GENTAMICIN: SIGNIFICANT CHANGE UP
-  IMIPENEM: SIGNIFICANT CHANGE UP
-  LEVOFLOXACIN: SIGNIFICANT CHANGE UP
-  MEROPENEM: SIGNIFICANT CHANGE UP
-  NITROFURANTOIN: SIGNIFICANT CHANGE UP
-  PIPERACILLIN/TAZOBACTAM: SIGNIFICANT CHANGE UP
-  TIGECYCLINE: SIGNIFICANT CHANGE UP
-  TOBRAMYCIN: SIGNIFICANT CHANGE UP
-  TRIMETHOPRIM/SULFAMETHOXAZOLE: SIGNIFICANT CHANGE UP
CULTURE RESULTS: SIGNIFICANT CHANGE UP
LEGIONELLA AG UR QL: NEGATIVE — SIGNIFICANT CHANGE UP
METHOD TYPE: SIGNIFICANT CHANGE UP
ORGANISM # SPEC MICROSCOPIC CNT: SIGNIFICANT CHANGE UP
ORGANISM # SPEC MICROSCOPIC CNT: SIGNIFICANT CHANGE UP
SPECIMEN SOURCE: SIGNIFICANT CHANGE UP

## 2022-05-06 PROCEDURE — 71045 X-RAY EXAM CHEST 1 VIEW: CPT | Mod: 26

## 2022-05-06 RX ORDER — POTASSIUM CHLORIDE 20 MEQ
10 PACKET (EA) ORAL DAILY
Refills: 0 | Status: DISCONTINUED | OUTPATIENT
Start: 2022-05-06 | End: 2022-05-10

## 2022-05-06 RX ADMIN — Medication 10 MILLIEQUIVALENT(S): at 13:17

## 2022-05-06 RX ADMIN — Medication 600 MILLIGRAM(S): at 18:42

## 2022-05-06 RX ADMIN — PIPERACILLIN AND TAZOBACTAM 25 GRAM(S): 4; .5 INJECTION, POWDER, LYOPHILIZED, FOR SOLUTION INTRAVENOUS at 05:53

## 2022-05-06 RX ADMIN — APIXABAN 2.5 MILLIGRAM(S): 2.5 TABLET, FILM COATED ORAL at 18:42

## 2022-05-06 RX ADMIN — APIXABAN 2.5 MILLIGRAM(S): 2.5 TABLET, FILM COATED ORAL at 05:52

## 2022-05-06 RX ADMIN — MIDODRINE HYDROCHLORIDE 10 MILLIGRAM(S): 2.5 TABLET ORAL at 18:42

## 2022-05-06 RX ADMIN — PIPERACILLIN AND TAZOBACTAM 25 GRAM(S): 4; .5 INJECTION, POWDER, LYOPHILIZED, FOR SOLUTION INTRAVENOUS at 21:09

## 2022-05-06 RX ADMIN — Medication 30 MILLIGRAM(S): at 05:52

## 2022-05-06 RX ADMIN — Medication 1 TABLET(S): at 07:52

## 2022-05-06 RX ADMIN — Medication 3 MILLIGRAM(S): at 21:09

## 2022-05-06 RX ADMIN — Medication 1 TABLET(S): at 18:42

## 2022-05-06 RX ADMIN — Medication 600 MILLIGRAM(S): at 05:52

## 2022-05-06 RX ADMIN — Medication 30 MILLIGRAM(S): at 18:42

## 2022-05-06 RX ADMIN — MIDODRINE HYDROCHLORIDE 10 MILLIGRAM(S): 2.5 TABLET ORAL at 05:52

## 2022-05-06 RX ADMIN — MIDODRINE HYDROCHLORIDE 10 MILLIGRAM(S): 2.5 TABLET ORAL at 13:16

## 2022-05-06 RX ADMIN — AMIODARONE HYDROCHLORIDE 200 MILLIGRAM(S): 400 TABLET ORAL at 05:53

## 2022-05-06 RX ADMIN — PIPERACILLIN AND TAZOBACTAM 25 GRAM(S): 4; .5 INJECTION, POWDER, LYOPHILIZED, FOR SOLUTION INTRAVENOUS at 13:26

## 2022-05-06 RX ADMIN — Medication 1 TABLET(S): at 13:16

## 2022-05-06 NOTE — PROGRESS NOTE ADULT - PROBLEM SELECTOR PLAN 1
Patient with possible sepsis, POA. Most likely due to pneumonia and UTI.   Clinically improved.   Continue antibiotics.  Supportive care.

## 2022-05-06 NOTE — PROGRESS NOTE ADULT - SUBJECTIVE AND OBJECTIVE BOX
Patient is a 89y old  Female who presents with a chief complaint of Shortness of breath, low BP (04 May 2022 12:46)    HPI:  89 years old female with past medical history of atrial fibrillation, COPD, history of pericardial effusion, pleural effusion in past, orthostatic hypotension, dementia, who was noted to have shortness of breath and low blood pressure at SNF. Patient was sent in to ER. In Er, patient is noted to have elevated WBC, Pneumonia on chest x-ray. She is being admitted for further care.     Patient is very poor historian. All information is from patient's chart and NH records.   She denies any chest pain or pressure.   No nausea or vomiting.  (03 May 2022 18:04)    INTERVAL HPI/OVERNIGHT EVENTS:  Chart reviewed, notes reviewed.   Patient seen and examined.  Being followed by following specialists: ID, pulmonary    Consultant(s) Notes Reviewed:  [X] Yes    Care Discussed with Consultants/Other Providers: [X] Yes    05/04/2022 --> Doing slightly better. More awake and alert. Denies any chest pain or pressure. No nausea or vomiting.  05/05/2022 --> Improving slowly. No shortness of breath on rest. No chest pain or pressure. No fever or chills.      05/06/2022 --> Patient is not eating that well today. She is comfortable. Denies any nausea or vomiting. No abdominal pain.     REVIEW OF SYSTEMS: ROS is very limited as patient is very poor historian.   CONSTITUTIONAL: + fatigue  EYES: No eye pain, or discharge  ENMT: No sinus or throat pain  NECK: No pain or stiffness  BREASTS: No pain, masses, or nipple discharge  RESPIRATORY: + cough, + shortness of breath  CARDIOVASCULAR: No chest pain, palpitations, dizziness, or leg swelling  GASTROINTESTINAL: No abdominal or epigastric pain. No nausea, vomiting.  GENITOURINARY: No dysuria, frequency, hematuria, or incontinence  NEUROLOGICAL: No loss of strength, numbness, or tremors  SKIN: No itching, burning, rashes, or lesions   LYMPH NODES: No enlarged glands  ENDOCRINE: No polydipsia or polyuria  MUSCULOSKELETAL: No muscle, back, or extremity pain  PSYCHIATRIC: No depression, anxiety, mood swings.  HEME/LYMPH: No easy bruising, or bleeding gums  ALLERGY AND IMMUNOLOGIC: No hives or eczema    Allergies    No Known Allergies    Intolerances      Home Medications:    MEDICATIONS  (STANDING):  aMIOdarone    Tablet 200 milliGRAM(s) Oral daily  apixaban 2.5 milliGRAM(s) Oral every 12 hours  guaiFENesin  milliGRAM(s) Oral every 12 hours  lactobacillus acidophilus 1 Tablet(s) Oral three times a day with meals  midodrine. 10 milliGRAM(s) Oral three times a day  oseltamivir 30 milliGRAM(s) Oral two times a day  piperacillin/tazobactam IVPB.. 3.375 Gram(s) IV Intermittent every 8 hours  potassium chloride    Tablet ER 10 milliEquivalent(s) Oral daily    MEDICATIONS  (PRN):  acetaminophen     Tablet .. 650 milliGRAM(s) Oral every 6 hours PRN Temp greater or equal to 38C (100.4F), Mild Pain (1 - 3)  albuterol/ipratropium for Nebulization 3 milliLiter(s) Nebulizer every 6 hours PRN Shortness of Breath and/or Wheezing  aluminum hydroxide/magnesium hydroxide/simethicone Suspension 30 milliLiter(s) Oral every 4 hours PRN Dyspepsia  melatonin 3 milliGRAM(s) Oral at bedtime PRN Insomnia  ondansetron Injectable 4 milliGRAM(s) IV Push every 8 hours PRN Nausea and/or Vomiting    Vital Signs Last 24 Hrs  T(C): 36.4 (06 May 2022 09:18), Max: 36.7 (05 May 2022 22:00)  T(F): 97.6 (06 May 2022 09:18), Max: 98.1 (05 May 2022 22:00)  HR: 65 (06 May 2022 09:18) (60 - 74)  BP: 118/73 (06 May 2022 09:18) (111/72 - 126/77)  BP(mean): --  RR: 18 (06 May 2022 09:18) (18 - 20)  SpO2: 95% (06 May 2022 09:18) (95% - 99%)    PHYSICAL EXAM:  GENERAL: Elderly female in no acute distress.   HEAD:  Atraumatic, Normocephalic  EYES: Pallor +  ENMT: Moist mucous membranes, no lesions  NECK: Supple.  CHEST/LUNG: Decreased breath sounds at bases, occasional rhonchi + R>L  HEART: S1, S2.   ABDOMEN: Soft, Nontender, Nondistended; Bowel sounds present  EXTREMITIES:  2+ Peripheral Pulses, No clubbing, cyanosis, or edema  MS: No joint swelling or deformity.   LYMPH: No lymphadenopathy noted  SKIN: No rashes or lesions  NERVOUS SYSTEM:  No focal deficit.   PSYCH:  Awake and alert but confused.   LABS:                           11.6   14.70 )-----------( 344      ( 05 May 2022 06:00 )             37.5     05 May 2022 06:00    140    |  104    |  22     ----------------------------<  99     3.4     |  31     |  0.70     Ca    8.6        05 May 2022 06:00  Mg     2.0       05 May 2022 06:00    TPro  6.8    /  Alb  1.8    /  TBili  0.3    /  DBili  x      /  AST  37     /  ALT  32     /  AlkPhos  90     05 May 2022 06:00    05-04 Chol 106 LDL -- HDL 20<L> Trig 147    Culture Results:   >100,000 CFU/ml Escherichia coli (05-03 @ 16:06)  Culture Results:   No growth to date. (05-03 @ 15:36)  Culture Results:   No growth to date. (05-03 @ 15:36)    Procalcitonin, Serum: 0.22 ng/mL (05-04-22 @ 07:28)    RADIOLOGY TEST: (IMAGES REVIEWED BY ME)    Imaging Personally Reviewed:  [X] YES      HEALTH ISSUES - PROBLEM Dx:  Sepsis    Pneumonia    Influenza A    Chronic atrial fibrillation    COPD (chronic obstructive pulmonary disease)    Orthostatic hypotension    Prophylactic measure

## 2022-05-06 NOTE — PROGRESS NOTE ADULT - SUBJECTIVE AND OBJECTIVE BOX
PULMONARY/CRITICAL CARE  DOS 22  Unchanged.   No fever. Sats ok. Some cough nonprod.     Pt. is poor historian.  Patient is a 89y old  Female who presents with a chief complaint of Shortness of breath, low BP (03 May 2022 18:04)/ right pneumonia.    BRIEF HOSPITAL COURSE: ***· Chief Complaint:  89y Female complaining of shortness of breath.  · HPI Objective Statement: 88 y/o female with PMHx orthostatic hypotension and A FIb BIBA from SNF due to SOB. As per EMS, pt found to be hypoxic in the 70s. As per Dr. harding, reports patient on augmentin for pneumonia and flu outbreak in SNF. pt denies chest pain, SOB, fever, vomiting, abd pain, leg swelling, or any other complaints.  She claims she has had influenza vaccine.     Events last 24 hours: ***    PAST MEDICAL & SURGICAL HISTORY:  Chronic atrial fibrillation    Orthostatic hypotension    COPD (chronic obstructive pulmonary disease)    Pericardial effusion    Pleural effusion    No significant past surgical history      Allergies    No Known Allergies    Intolerances      FAMILY HISTORY/ social: denies cigs, etoh          Medications:  oseltamivir 30 milliGRAM(s) Oral two times a day  piperacillin/tazobactam IVPB.. 3.375 Gram(s) IV Intermittent every 8 hours    aMIOdarone    Tablet 200 milliGRAM(s) Oral daily  midodrine. 10 milliGRAM(s) Oral three times a day    albuterol/ipratropium for Nebulization 3 milliLiter(s) Nebulizer every 6 hours PRN  guaiFENesin  milliGRAM(s) Oral every 12 hours    acetaminophen     Tablet .. 650 milliGRAM(s) Oral every 6 hours PRN  melatonin 3 milliGRAM(s) Oral at bedtime PRN  ondansetron Injectable 4 milliGRAM(s) IV Push every 8 hours PRN      apixaban 2.5 milliGRAM(s) Oral every 12 hours    aluminum hydroxide/magnesium hydroxide/simethicone Suspension 30 milliLiter(s) Oral every 4 hours PRN        potassium chloride  20 mEq/100 mL IVPB 20 milliEquivalent(s) IV Intermittent every 2 hours  sodium chloride 0.9%. 1000 milliLiter(s) IV Continuous <Continuous>        lactobacillus acidophilus 1 Tablet(s) Oral three times a day with meals          ICU Vital Signs Last 24 Hrs  T(C): 36.5 (04 May 2022 05:00), Max: 37.8 (03 May 2022 14:55)  T(F): 97.7 (04 May 2022 05:00), Max: 100 (03 May 2022 14:55)  HR: 62 (04 May 2022 05:00) (60 - 72)  BP: 97/54 (04 May 2022 05:00) (91/55 - 113/58)  BP(mean): 67 (04 May 2022 05:00) (67 - 74)  ABP: --  ABP(mean): --  RR: 18 (04 May 2022 05:00) (18 - 22)  SpO2: 96% (04 May 2022 05:00) (90% - 98%)    Vital Signs Last 24 Hrs  T(C): 36.5 (04 May 2022 05:00), Max: 37.8 (03 May 2022 14:55)  T(F): 97.7 (04 May 2022 05:00), Max: 100 (03 May 2022 14:55)  HR: 62 (04 May 2022 05:00) (60 - 72)  BP: 97/54 (04 May 2022 05:00) (91/55 - 113/58)  BP(mean): 67 (04 May 2022 05:00) (67 - 74)  RR: 18 (04 May 2022 05:00) (18 - 22)  SpO2: 96% (04 May 2022 05:00) (90% - 98%)        I&O's Detail        LABS:                        11.0   17.28 )-----------( 295      ( 04 May 2022 05:30 )             33.7     05-04    140  |  103  |  39<H>  ----------------------------<  117<H>  2.9<LL>   |  30  |  0.93    Ca    8.2<L>      04 May 2022 05:30    TPro  6.3  /  Alb  1.6<L>  /  TBili  0.4  /  DBili  x   /  AST  38  /  ALT  33  /  AlkPhos  78  05-04          CAPILLARY BLOOD GLUCOSE        PT/INR - ( 03 May 2022 15:36 )   PT: 24.5 sec;   INR: 2.11 ratio         PTT - ( 03 May 2022 15:36 )  PTT:30.9 sec  Urinalysis Basic - ( 03 May 2022 16:06 )    Color: Yellow / Appearance: Clear / S.015 / pH: x  Gluc: x / Ketone: Negative  / Bili: Negative / Urobili: Negative mg/dL   Blood: x / Protein: 15 mg/dL / Nitrite: Negative   Leuk Esterase: Moderate / RBC: 0-2 /HPF / WBC 6-10   Sq Epi: x / Non Sq Epi: Few / Bacteria: Few      CULTURES:      Physical Examination:    General: No acute distress.  thin elderly female sitting up comfortably    HEENT: Pupils equal, reactive to light.  Symmetric. poor dentition    PULM: Clear to auscultation bilaterally, no wheeze rhonchi rales no change percussion    CVS: irregular rate and rhythm, no murmurs, rubs, or gallops    ABD: Soft, nondistended, nontender, normoactive bowel sounds, no masses    EXT: No edema, nontender calves    SKIN: Warm and well perfused, no rashes noted.    NEURO: Alert, somewhat confused, interactive, nonfocal    RADIOLOGY: ***CXR RLL / left base infiltrae    CRITICAL CARE TIME SPENT: ***

## 2022-05-06 NOTE — PROGRESS NOTE ADULT - SUBJECTIVE AND OBJECTIVE BOX
SHAZIA PERDOMO is a 89yFemale , patient examined and chart reviewed. Patient seen and examined today being followed for       INTERVAL HPI/ OVERNIGHT EVENTS:   Afebrile. No events.  NAD.    PAST MEDICAL & SURGICAL HISTORY:  Chronic atrial fibrillation  Orthostatic hypotension  COPD (chronic obstructive pulmonary disease)  Pericardial effusion  Pleural effusion  No significant past surgical history      For details regarding the patient's social history, family history, and other miscellaneous elements, please refer the initial infectious diseases consultation and/or the admitting history and physical examination for this admission.    ROS:  Unable to obtain due to : Dementia    Current inpatient medications :    ANTIBIOTICS/RELEVANT:  oseltamivir 30 milliGRAM(s) Oral two times a day  piperacillin/tazobactam IVPB.. 3.375 Gram(s) IV Intermittent every 8 hours      MEDICATIONS  (STANDING):  aMIOdarone    Tablet 200 milliGRAM(s) Oral daily  apixaban 2.5 milliGRAM(s) Oral every 12 hours  guaiFENesin  milliGRAM(s) Oral every 12 hours  lactobacillus acidophilus 1 Tablet(s) Oral three times a day with meals  midodrine. 10 milliGRAM(s) Oral three times a day  potassium chloride    Tablet ER 10 milliEquivalent(s) Oral daily    MEDICATIONS  (PRN):  acetaminophen     Tablet .. 650 milliGRAM(s) Oral every 6 hours PRN Temp greater or equal to 38C (100.4F), Mild Pain (1 - 3)  albuterol/ipratropium for Nebulization 3 milliLiter(s) Nebulizer every 6 hours PRN Shortness of Breath and/or Wheezing  aluminum hydroxide/magnesium hydroxide/simethicone Suspension 30 milliLiter(s) Oral every 4 hours PRN Dyspepsia  melatonin 3 milliGRAM(s) Oral at bedtime PRN Insomnia  ondansetron Injectable 4 milliGRAM(s) IV Push every 8 hours PRN Nausea and/or Vomiting        Objective:  Vital Signs Last 24 Hrs  T(C): 36.4 (06 May 2022 09:18), Max: 36.7 (05 May 2022 22:00)  T(F): 97.6 (06 May 2022 09:18), Max: 98.1 (05 May 2022 22:00)  HR: 65 (06 May 2022 09:18) (60 - 74)  BP: 118/73 (06 May 2022 09:18) (111/72 - 126/77)  RR: 18 (06 May 2022 09:18) (18 - 20)  SpO2: 95% (06 May 2022 09:18) (95% - 99%)    Physical Exam:  General: no acute distress  Neck: supple, trachea midline  Lungs: Decreased, no wheeze/rhonchi  Cardiovascular: regular rate and rhythm, S1 S2  Abdomen: soft, nontender,  bowel sounds normal  Neurological: awake confused  Skin: no rash  Extremities: no edema      LABS:                        11.6   14.70 )-----------( 344      ( 05 May 2022 06:00 )             37.5   05-05    140  |  104  |  22  ----------------------------<  99  3.4<L>   |  31  |  0.70    Ca    8.6      05 May 2022 06:00  Mg     2.0     05-05    TPro  6.8  /  Alb  1.8<L>  /  TBili  0.3  /  DBili  x   /  AST  37  /  ALT  32  /  AlkPhos  90  05-05    Urinalysis Basic - ( 03 May 2022 16:06 )    Color: Yellow / Appearance: Clear / S.015 / pH: x  Gluc: x / Ketone: Negative  / Bili: Negative / Urobili: Negative mg/dL   Blood: x / Protein: 15 mg/dL / Nitrite: Negative   Leuk Esterase: Moderate / RBC: 0-2 /HPF / WBC 6-10   Sq Epi: x / Non Sq Epi: Few / Bacteria: Few    MICROBIOLOGY:    Culture - Urine (collected 03 May 2022 16:06)  Source: Clean Catch Clean Catch (Midstream)  Preliminary Report (04 May 2022 21:39):    >100,000 CFU/ml Escherichia coli    Culture - Blood (collected 03 May 2022 15:36)  Source: .Blood Blood-Peripheral  Preliminary Report (04 May 2022 23:01):    No growth to date.    Culture - Blood (collected 03 May 2022 15:36)  Source: .Blood Blood-Peripheral  Preliminary Report (04 May 2022 23:01):    No growth to date.      Respiratory Viral Panel with COVID-19 by ALEJANDRA (22 @ 15:36)    Rapid RVP Result: Detected    SARS-CoV-2: NotDetec: This Respiratory Panel uses polymerase chain reaction (PCR) to detect for  adenovirus; coronavirus (HKU1, NL63, 229E, OC43); human metapneumovirus  (hMPV); human enterovirus/rhinovirus (Entero/RV); influenza A; influenza  A/H1; influenza A/H3; influenza A/H1-2009; influenza B; parainfluenza  viruses 1, 2, 3, 4; respiratory syncytial virus; Mycoplasma pneumoniae;  Chlamydophila pneumoniae; and SARS-CoV-2.    Adenovirus (RapRVP): NotDetec    Influenza A (RapRVP): NotDetec    Influenza AH1 2009 (RapRVP): NotDetec    Influenza AH1 (RapRVP): NotDetec    Influenza AH3 (RapRVP): Detected    Influenza B (RapRVP): NotDetec    Parainfluenza 1 (RapRVP): NotDetec    Parainfluenza 2 (RapRVP): NotDetec    Parainfluenza 3 (RapRVP): NotDetec    Parainfluenza 4 (RapRVP): NotDetec    Chlamydia pneumoniae (RapRVP): NotDetec    Mycoplasma pneumoniae (RapRVP): NotDetec    Entero/Rhinovirus (RapRVP): NotDetec    hMPV (RapRVP): NotDetec    Coronavirus (229E,HKU1,NL63,OC43): NotDetec    RADIOLOGY & ADDITIONAL STUDIES:    ACC: [id number] EXAM:  XR CHEST PORTABLE URGENT 1V                          PROCEDURE DATE:  2022          INTERPRETATION:  Sepsis.    AP chest.    Normal heart size. Atherosclerotic aorta. Hyperinflated lungs. Airspace   infiltrates in the right mid and lower lung field and at the left base   consistent with bilateral pneumonia. Trace bilateral pleural effusions.   Question underlying nodular opacity left base. Correlate with chest CT.   Calcified granuloma right apex. Biapical pleural thickening.    IMPRESSION: Bilateral pneumonia. Question underlying nodular opacity left   base. Correlate with chest CT.    Assessment :   88YO F PMH atrial fibrillation, COPD, history of pericardial effusion, pleural effusion in past, orthostatic hypotension, dementia, presented from [facility] admitted with Influenza A with ovidio pneumonia. - probable superimposed bacterial pneumonia.   Asymptomatic bacteruria  WBC downtrending  MBS with dysphagia    Plan :   Tamiflu x 5 days  Cont Zosyn  Trend temps and cbc  Fu Legionella and pneumococcal antigen  Asp precautions      Continue with present regiment.  Appropriate use of antibiotics and adverse effects reviewed.    > 35 minutes were spent in direct patient care reviewing notes, medications ,labs data/ imaging , discussion with multidisciplinary team.    Thank you for allowing me to participate in care of your patient .    Karthikeyan Dai MD  Infectious Disease  [phone]

## 2022-05-07 LAB
ANION GAP SERPL CALC-SCNC: 7 MMOL/L — SIGNIFICANT CHANGE UP (ref 5–17)
BASOPHILS # BLD AUTO: 0.04 K/UL — SIGNIFICANT CHANGE UP (ref 0–0.2)
BASOPHILS NFR BLD AUTO: 0.3 % — SIGNIFICANT CHANGE UP (ref 0–2)
BUN SERPL-MCNC: 12 MG/DL — SIGNIFICANT CHANGE UP (ref 7–23)
CALCIUM SERPL-MCNC: 8.5 MG/DL — SIGNIFICANT CHANGE UP (ref 8.4–10.5)
CHLORIDE SERPL-SCNC: 108 MMOL/L — SIGNIFICANT CHANGE UP (ref 96–108)
CO2 SERPL-SCNC: 29 MMOL/L — SIGNIFICANT CHANGE UP (ref 22–31)
CREAT SERPL-MCNC: 0.55 MG/DL — SIGNIFICANT CHANGE UP (ref 0.5–1.3)
EGFR: 88 ML/MIN/1.73M2 — SIGNIFICANT CHANGE UP
EOSINOPHIL # BLD AUTO: 0.01 K/UL — SIGNIFICANT CHANGE UP (ref 0–0.5)
EOSINOPHIL NFR BLD AUTO: 0.1 % — SIGNIFICANT CHANGE UP (ref 0–6)
GLUCOSE SERPL-MCNC: 107 MG/DL — HIGH (ref 70–99)
HCT VFR BLD CALC: 34.7 % — SIGNIFICANT CHANGE UP (ref 34.5–45)
HGB BLD-MCNC: 11 G/DL — LOW (ref 11.5–15.5)
IMM GRANULOCYTES NFR BLD AUTO: 1.6 % — HIGH (ref 0–1.5)
LYMPHOCYTES # BLD AUTO: 1.55 K/UL — SIGNIFICANT CHANGE UP (ref 1–3.3)
LYMPHOCYTES # BLD AUTO: 13 % — SIGNIFICANT CHANGE UP (ref 13–44)
MCHC RBC-ENTMCNC: 30.6 PG — SIGNIFICANT CHANGE UP (ref 27–34)
MCHC RBC-ENTMCNC: 31.7 GM/DL — LOW (ref 32–36)
MCV RBC AUTO: 96.7 FL — SIGNIFICANT CHANGE UP (ref 80–100)
MONOCYTES # BLD AUTO: 0.79 K/UL — SIGNIFICANT CHANGE UP (ref 0–0.9)
MONOCYTES NFR BLD AUTO: 6.6 % — SIGNIFICANT CHANGE UP (ref 2–14)
NEUTROPHILS # BLD AUTO: 9.32 K/UL — HIGH (ref 1.8–7.4)
NEUTROPHILS NFR BLD AUTO: 78.4 % — HIGH (ref 43–77)
NRBC # BLD: 0 /100 WBCS — SIGNIFICANT CHANGE UP (ref 0–0)
PLATELET # BLD AUTO: 371 K/UL — SIGNIFICANT CHANGE UP (ref 150–400)
POTASSIUM SERPL-MCNC: 3.2 MMOL/L — LOW (ref 3.5–5.3)
POTASSIUM SERPL-SCNC: 3.2 MMOL/L — LOW (ref 3.5–5.3)
RBC # BLD: 3.59 M/UL — LOW (ref 3.8–5.2)
RBC # FLD: 15 % — HIGH (ref 10.3–14.5)
SODIUM SERPL-SCNC: 144 MMOL/L — SIGNIFICANT CHANGE UP (ref 135–145)
WBC # BLD: 11.9 K/UL — HIGH (ref 3.8–10.5)
WBC # FLD AUTO: 11.9 K/UL — HIGH (ref 3.8–10.5)

## 2022-05-07 PROCEDURE — 71045 X-RAY EXAM CHEST 1 VIEW: CPT | Mod: 26

## 2022-05-07 RX ORDER — POTASSIUM CHLORIDE 20 MEQ
10 PACKET (EA) ORAL
Refills: 0 | Status: COMPLETED | OUTPATIENT
Start: 2022-05-07 | End: 2022-05-07

## 2022-05-07 RX ORDER — POTASSIUM CHLORIDE 20 MEQ
20 PACKET (EA) ORAL
Refills: 0 | Status: DISCONTINUED | OUTPATIENT
Start: 2022-05-07 | End: 2022-05-07

## 2022-05-07 RX ADMIN — Medication 100 MILLIEQUIVALENT(S): at 16:35

## 2022-05-07 RX ADMIN — Medication 3 MILLIGRAM(S): at 21:38

## 2022-05-07 RX ADMIN — PIPERACILLIN AND TAZOBACTAM 25 GRAM(S): 4; .5 INJECTION, POWDER, LYOPHILIZED, FOR SOLUTION INTRAVENOUS at 06:05

## 2022-05-07 RX ADMIN — PIPERACILLIN AND TAZOBACTAM 25 GRAM(S): 4; .5 INJECTION, POWDER, LYOPHILIZED, FOR SOLUTION INTRAVENOUS at 21:38

## 2022-05-07 RX ADMIN — AMIODARONE HYDROCHLORIDE 200 MILLIGRAM(S): 400 TABLET ORAL at 06:06

## 2022-05-07 RX ADMIN — Medication 600 MILLIGRAM(S): at 06:05

## 2022-05-07 RX ADMIN — Medication 100 MILLIEQUIVALENT(S): at 17:47

## 2022-05-07 RX ADMIN — Medication 100 MILLIEQUIVALENT(S): at 18:53

## 2022-05-07 RX ADMIN — APIXABAN 2.5 MILLIGRAM(S): 2.5 TABLET, FILM COATED ORAL at 17:49

## 2022-05-07 RX ADMIN — MIDODRINE HYDROCHLORIDE 10 MILLIGRAM(S): 2.5 TABLET ORAL at 12:42

## 2022-05-07 RX ADMIN — Medication 10 MILLIEQUIVALENT(S): at 12:40

## 2022-05-07 RX ADMIN — APIXABAN 2.5 MILLIGRAM(S): 2.5 TABLET, FILM COATED ORAL at 06:05

## 2022-05-07 RX ADMIN — Medication 600 MILLIGRAM(S): at 17:49

## 2022-05-07 RX ADMIN — Medication 1 TABLET(S): at 17:48

## 2022-05-07 RX ADMIN — Medication 1 TABLET(S): at 12:42

## 2022-05-07 RX ADMIN — Medication 30 MILLIGRAM(S): at 06:05

## 2022-05-07 RX ADMIN — MIDODRINE HYDROCHLORIDE 10 MILLIGRAM(S): 2.5 TABLET ORAL at 17:49

## 2022-05-07 RX ADMIN — PIPERACILLIN AND TAZOBACTAM 25 GRAM(S): 4; .5 INJECTION, POWDER, LYOPHILIZED, FOR SOLUTION INTRAVENOUS at 16:01

## 2022-05-07 RX ADMIN — Medication 30 MILLIGRAM(S): at 17:47

## 2022-05-07 RX ADMIN — MIDODRINE HYDROCHLORIDE 10 MILLIGRAM(S): 2.5 TABLET ORAL at 06:05

## 2022-05-07 NOTE — PROGRESS NOTE ADULT - SUBJECTIVE AND OBJECTIVE BOX
Patient is a 89y old  Female who presents with a chief complaint of Shortness of breath, low BP (04 May 2022 12:46)    HPI: 89 years old female with past medical history of atrial fibrillation, COPD, history of pericardial effusion, pleural effusion in past, orthostatic hypotension, dementia, who was noted to have shortness of breath and low blood pressure at SNF. Patient was sent in to ER. In Er, patient is noted to have elevated WBC, Pneumonia on chest x-ray. She is being admitted for further care.     Patient is very poor historian. All information is from patient's chart and NH records.   She denies any chest pain or pressure.   No nausea or vomiting.  (03 May 2022 18:04)    INTERVAL HPI/OVERNIGHT EVENTS:  Chart reviewed, notes reviewed.   Patient seen and examined.  Being followed by following specialists: ID, pulmonary    Consultant(s) Notes Reviewed:  [X] Yes    Care Discussed with Consultants/Other Providers: [X] Yes    05/04/2022 --> Doing slightly better. More awake and alert. Denies any chest pain or pressure. No nausea or vomiting.  05/05/2022 --> Improving slowly. No shortness of breath on rest. No chest pain or pressure. No fever or chills.    05/06/2022 --> Patient is not eating that well today. She is comfortable. Denies any nausea or vomiting. No abdominal pain.     05/07/2022 --> Doing well. No new issues. No chest pain or pressure.     REVIEW OF SYSTEMS: ROS is very limited as patient is very poor historian.   CONSTITUTIONAL: + fatigue  EYES: No eye pain, or discharge  ENMT: No sinus or throat pain  NECK: No pain or stiffness  BREASTS: No pain, masses, or nipple discharge  RESPIRATORY: + cough, + shortness of breath  CARDIOVASCULAR: No chest pain, palpitations, dizziness, or leg swelling  GASTROINTESTINAL: No abdominal or epigastric pain. No nausea, vomiting.  GENITOURINARY: No dysuria, frequency, hematuria, or incontinence  NEUROLOGICAL: No loss of strength, numbness, or tremors  SKIN: No itching, burning, rashes, or lesions   LYMPH NODES: No enlarged glands  ENDOCRINE: No polydipsia or polyuria  MUSCULOSKELETAL: No muscle, back, or extremity pain  PSYCHIATRIC: No depression, anxiety, mood swings.  HEME/LYMPH: No easy bruising, or bleeding gums  ALLERGY AND IMMUNOLOGIC: No hives or eczema    Allergies    No Known Allergies    Intolerances      Home Medications:    MEDICATIONS  (STANDING):  aMIOdarone    Tablet 200 milliGRAM(s) Oral daily  apixaban 2.5 milliGRAM(s) Oral every 12 hours  guaiFENesin  milliGRAM(s) Oral every 12 hours  lactobacillus acidophilus 1 Tablet(s) Oral three times a day with meals  midodrine. 10 milliGRAM(s) Oral three times a day  oseltamivir 30 milliGRAM(s) Oral two times a day  piperacillin/tazobactam IVPB.. 3.375 Gram(s) IV Intermittent every 8 hours  potassium chloride    Tablet ER 10 milliEquivalent(s) Oral daily  potassium chloride  10 mEq/100 mL IVPB 10 milliEquivalent(s) IV Intermittent every 1 hour    MEDICATIONS  (PRN):  acetaminophen     Tablet .. 650 milliGRAM(s) Oral every 6 hours PRN Temp greater or equal to 38C (100.4F), Mild Pain (1 - 3)  albuterol/ipratropium for Nebulization 3 milliLiter(s) Nebulizer every 6 hours PRN Shortness of Breath and/or Wheezing  aluminum hydroxide/magnesium hydroxide/simethicone Suspension 30 milliLiter(s) Oral every 4 hours PRN Dyspepsia  melatonin 3 milliGRAM(s) Oral at bedtime PRN Insomnia  ondansetron Injectable 4 milliGRAM(s) IV Push every 8 hours PRN Nausea and/or Vomiting    Vital Signs Last 24 Hrs  T(C): 36.5 (07 May 2022 14:20), Max: 36.6 (06 May 2022 19:26)  T(F): 97.7 (07 May 2022 14:20), Max: 97.9 (06 May 2022 19:26)  HR: 99 (07 May 2022 14:20) (65 - 99)  BP: 121/79 (07 May 2022 14:20) (92/54 - 131/77)  BP(mean): --  RR: 17 (07 May 2022 14:20) (17 - 18)  SpO2: 93% (07 May 2022 14:20) (93% - 96%)    PHYSICAL EXAM:  GENERAL: Elderly female in no acute distress.   HEAD:  Atraumatic, Normocephalic  EYES: Pallor +  ENMT: Moist mucous membranes, no lesions  NECK: Supple.  CHEST/LUNG: Decreased breath sounds at bases, occasional rhonchi + R>L  HEART: S1, S2.   ABDOMEN: Soft, Nontender, Nondistended; Bowel sounds present  EXTREMITIES:  2+ Peripheral Pulses, No clubbing, cyanosis, or edema  MS: No joint swelling or deformity.   LYMPH: No lymphadenopathy noted  SKIN: No rashes or lesions  NERVOUS SYSTEM:  No focal deficit.   PSYCH:  Awake and alert but confused.   LABS:                         11.0   11.90 )-----------( 371      ( 07 May 2022 08:09 )             34.7     07 May 2022 08:09    144    |  108    |  12     ----------------------------<  107    3.2     |  29     |  0.55     Ca    8.5        07 May 2022 08:09    05-04 Chol 106 LDL -- HDL 20<L> Trig 147    Culture Results:   >100,000 CFU/ml Escherichia coli (05-03 @ 16:06)  Culture Results:   No growth to date. (05-03 @ 15:36)  Culture Results:   No growth to date. (05-03 @ 15:36)    Procalcitonin, Serum: 0.22 ng/mL (05-04-22 @ 07:28)    RADIOLOGY TEST: (IMAGES REVIEWED BY ME)    Imaging Personally Reviewed:  [X] YES      HEALTH ISSUES - PROBLEM Dx:  Sepsis    Pneumonia    Influenza A    Chronic atrial fibrillation    COPD (chronic obstructive pulmonary disease)    Orthostatic hypotension    Prophylactic measure

## 2022-05-07 NOTE — PROGRESS NOTE ADULT - PROBLEM SELECTOR PLAN 7
Patient with positive urine culture.   Continue IV antibiotics. Patient with positive urine culture.   possibly asymptomatic bacteruria.   Continue IV antibiotics.

## 2022-05-07 NOTE — PROGRESS NOTE ADULT - SUBJECTIVE AND OBJECTIVE BOX
SHAZIA PERDOMO is a 89yFemale , patient examined and chart reviewed. Patient seen and examined today being followed for       INTERVAL HPI/ OVERNIGHT EVENTS:   Afebrile. NAD.  No events.    PAST MEDICAL & SURGICAL HISTORY:  Chronic atrial fibrillation  Orthostatic hypotension  COPD (chronic obstructive pulmonary disease)  Pericardial effusion  Pleural effusion  No significant past surgical history      For details regarding the patient's social history, family history, and other miscellaneous elements, please refer the initial infectious diseases consultation and/or the admitting history and physical examination for this admission.    ROS:  Unable to obtain due to : Dementia    Current inpatient medications :    ANTIBIOTICS/RELEVANT:  oseltamivir 30 milliGRAM(s) Oral two times a day  piperacillin/tazobactam IVPB.. 3.375 Gram(s) IV Intermittent every 8 hours    MEDICATIONS  (STANDING):  aMIOdarone    Tablet 200 milliGRAM(s) Oral daily  apixaban 2.5 milliGRAM(s) Oral every 12 hours  guaiFENesin  milliGRAM(s) Oral every 12 hours  lactobacillus acidophilus 1 Tablet(s) Oral three times a day with meals  midodrine. 10 milliGRAM(s) Oral three times a day  potassium chloride    Tablet ER 10 milliEquivalent(s) Oral daily  potassium chloride  10 mEq/100 mL IVPB 10 milliEquivalent(s) IV Intermittent every 1 hour    MEDICATIONS  (PRN):  acetaminophen     Tablet .. 650 milliGRAM(s) Oral every 6 hours PRN Temp greater or equal to 38C (100.4F), Mild Pain (1 - 3)  albuterol/ipratropium for Nebulization 3 milliLiter(s) Nebulizer every 6 hours PRN Shortness of Breath and/or Wheezing  aluminum hydroxide/magnesium hydroxide/simethicone Suspension 30 milliLiter(s) Oral every 4 hours PRN Dyspepsia  melatonin 3 milliGRAM(s) Oral at bedtime PRN Insomnia  ondansetron Injectable 4 milliGRAM(s) IV Push every 8 hours PRN Nausea and/or Vomiting      Objective:  Vital Signs Last 24 Hrs  T(C): 36.5 (07 May 2022 14:20), Max: 36.6 (06 May 2022 19:26)  T(F): 97.7 (07 May 2022 14:20), Max: 97.9 (06 May 2022 19:26)  HR: 99 (07 May 2022 14:20) (65 - 99)  BP: 121/79 (07 May 2022 14:20) (92/54 - 131/77)  RR: 17 (07 May 2022 14:20) (17 - 18)  SpO2: 93% (07 May 2022 14:20) (93% - 96%)    Physical Exam:  General: no acute distress  Neck: supple, trachea midline  Lungs: Decreased, no wheeze/rhonchi  Cardiovascular: regular rate and rhythm, S1 S2  Abdomen: soft, nontender,  bowel sounds normal  Neurological: awake confused  Skin: no rash  Extremities: no edema      LABS:                        11.0   11.90 )-----------( 371      ( 07 May 2022 08:09 )             34.7   05-    144  |  108  |  12  ----------------------------<  107<H>  3.2<L>   |  29  |  0.55    Ca    8.5      07 May 2022 08:09      Urinalysis Basic - ( 03 May 2022 16:06 )    Color: Yellow / Appearance: Clear / S.015 / pH: x  Gluc: x / Ketone: Negative  / Bili: Negative / Urobili: Negative mg/dL   Blood: x / Protein: 15 mg/dL / Nitrite: Negative   Leuk Esterase: Moderate / RBC: 0-2 /HPF / WBC 6-10   Sq Epi: x / Non Sq Epi: Few / Bacteria: Few    MICROBIOLOGY:    Culture - Urine (collected 03 May 2022 16:06)  Source: Clean Catch Clean Catch (Midstream)  Preliminary Report (04 May 2022 21:39):    >100,000 CFU/ml Escherichia coli    Culture - Blood (collected 03 May 2022 15:36)  Source: .Blood Blood-Peripheral  Preliminary Report (04 May 2022 23:01):    No growth to date.    Culture - Blood (collected 03 May 2022 15:36)  Source: .Blood Blood-Peripheral  Preliminary Report (04 May 2022 23:01):    No growth to date.    Legionella pneumophila Antigen, Urine (22 @ 16:31)    Legionella Antigen, Urine: Negative    Respiratory Viral Panel with COVID-19 by ALEJANDRA (22 @ 15:36)    Rapid RVP Result: Detected    SARS-CoV-2: NotDetec: This Respiratory Panel uses polymerase chain reaction (PCR) to detect for  adenovirus; coronavirus (HKU1, NL63, 229E, OC43); human metapneumovirus  (hMPV); human enterovirus/rhinovirus (Entero/RV); influenza A; influenza  A/H1; influenza A/H3; influenza A/H1-2009; influenza B; parainfluenza  viruses 1, 2, 3, 4; respiratory syncytial virus; Mycoplasma pneumoniae;  Chlamydophila pneumoniae; and SARS-CoV-2.    Adenovirus (RapRVP): NotDetec    Influenza A (RapRVP): NotDetec    Influenza AH1  (RapRVP): NotDetec    Influenza AH1 (RapRVP): NotDetec    Influenza AH3 (RapRVP): Detected    Influenza B (RapRVP): NotDetec    Parainfluenza 1 (RapRVP): NotDetec    Parainfluenza 2 (RapRVP): NotDetec    Parainfluenza 3 (RapRVP): NotDetec    Parainfluenza 4 (RapRVP): NotDetec    Chlamydia pneumoniae (RapRVP): NotDetec    Mycoplasma pneumoniae (RapRVP): NotDetec    Entero/Rhinovirus (RapRVP): NotDetec    hMPV (RapRVP): NotDetec    Coronavirus (229E,HKU1,NL63,OC43): NotDetec    RADIOLOGY & ADDITIONAL STUDIES:    ACC: [id number] EXAM:  XR CHEST PORTABLE URGENT 1V                          PROCEDURE DATE:  2022          INTERPRETATION:  Sepsis.    AP chest.    Normal heart size. Atherosclerotic aorta. Hyperinflated lungs. Airspace   infiltrates in the right mid and lower lung field and at the left base   consistent with bilateral pneumonia. Trace bilateral pleural effusions.   Question underlying nodular opacity left base. Correlate with chest CT.   Calcified granuloma right apex. Biapical pleural thickening.    IMPRESSION: Bilateral pneumonia. Question underlying nodular opacity left   base. Correlate with chest CT.    Assessment :   88YO F PMH atrial fibrillation, COPD, history of pericardial effusion, pleural effusion in past, orthostatic hypotension, dementia, presented from [facility] admitted with Influenza A with ovidio pneumonia. - probable superimposed bacterial pneumonia.   Asymptomatic bacteruria  WBC downtrending  MBS with dysphagia    Plan :   Tamiflu x 5 days  Cont Zosyn x 5 days  Trend temps and cbc  Asp precautions  Stable from ID standpoint      Continue with present regiment.  Appropriate use of antibiotics and adverse effects reviewed.    > 35 minutes were spent in direct patient care reviewing notes, medications ,labs data/ imaging , discussion with multidisciplinary team.    Thank you for allowing me to participate in care of your patient .    Karthikeyan Dai MD  Infectious Disease  [phone]

## 2022-05-08 LAB
ANION GAP SERPL CALC-SCNC: 9 MMOL/L — SIGNIFICANT CHANGE UP (ref 5–17)
BASOPHILS # BLD AUTO: 0.04 K/UL — SIGNIFICANT CHANGE UP (ref 0–0.2)
BASOPHILS NFR BLD AUTO: 0.3 % — SIGNIFICANT CHANGE UP (ref 0–2)
BUN SERPL-MCNC: 11 MG/DL — SIGNIFICANT CHANGE UP (ref 7–23)
CALCIUM SERPL-MCNC: 8.2 MG/DL — LOW (ref 8.4–10.5)
CHLORIDE SERPL-SCNC: 107 MMOL/L — SIGNIFICANT CHANGE UP (ref 96–108)
CO2 SERPL-SCNC: 26 MMOL/L — SIGNIFICANT CHANGE UP (ref 22–31)
CREAT SERPL-MCNC: 0.45 MG/DL — LOW (ref 0.5–1.3)
CULTURE RESULTS: SIGNIFICANT CHANGE UP
CULTURE RESULTS: SIGNIFICANT CHANGE UP
EGFR: 92 ML/MIN/1.73M2 — SIGNIFICANT CHANGE UP
EOSINOPHIL # BLD AUTO: 0.02 K/UL — SIGNIFICANT CHANGE UP (ref 0–0.5)
EOSINOPHIL NFR BLD AUTO: 0.2 % — SIGNIFICANT CHANGE UP (ref 0–6)
GLUCOSE SERPL-MCNC: 92 MG/DL — SIGNIFICANT CHANGE UP (ref 70–99)
HCT VFR BLD CALC: 35.5 % — SIGNIFICANT CHANGE UP (ref 34.5–45)
HGB BLD-MCNC: 11.3 G/DL — LOW (ref 11.5–15.5)
IMM GRANULOCYTES NFR BLD AUTO: 1.7 % — HIGH (ref 0–1.5)
LYMPHOCYTES # BLD AUTO: 1.42 K/UL — SIGNIFICANT CHANGE UP (ref 1–3.3)
LYMPHOCYTES # BLD AUTO: 10.9 % — LOW (ref 13–44)
MCHC RBC-ENTMCNC: 30.7 PG — SIGNIFICANT CHANGE UP (ref 27–34)
MCHC RBC-ENTMCNC: 31.8 GM/DL — LOW (ref 32–36)
MCV RBC AUTO: 96.5 FL — SIGNIFICANT CHANGE UP (ref 80–100)
MONOCYTES # BLD AUTO: 0.67 K/UL — SIGNIFICANT CHANGE UP (ref 0–0.9)
MONOCYTES NFR BLD AUTO: 5.1 % — SIGNIFICANT CHANGE UP (ref 2–14)
NEUTROPHILS # BLD AUTO: 10.65 K/UL — HIGH (ref 1.8–7.4)
NEUTROPHILS NFR BLD AUTO: 81.8 % — HIGH (ref 43–77)
NRBC # BLD: 0 /100 WBCS — SIGNIFICANT CHANGE UP (ref 0–0)
PLATELET # BLD AUTO: 352 K/UL — SIGNIFICANT CHANGE UP (ref 150–400)
POTASSIUM SERPL-MCNC: 3.6 MMOL/L — SIGNIFICANT CHANGE UP (ref 3.5–5.3)
POTASSIUM SERPL-SCNC: 3.6 MMOL/L — SIGNIFICANT CHANGE UP (ref 3.5–5.3)
RBC # BLD: 3.68 M/UL — LOW (ref 3.8–5.2)
RBC # FLD: 14.9 % — HIGH (ref 10.3–14.5)
SODIUM SERPL-SCNC: 142 MMOL/L — SIGNIFICANT CHANGE UP (ref 135–145)
SPECIMEN SOURCE: SIGNIFICANT CHANGE UP
SPECIMEN SOURCE: SIGNIFICANT CHANGE UP
WBC # BLD: 13.02 K/UL — HIGH (ref 3.8–10.5)
WBC # FLD AUTO: 13.02 K/UL — HIGH (ref 3.8–10.5)

## 2022-05-08 RX ADMIN — APIXABAN 2.5 MILLIGRAM(S): 2.5 TABLET, FILM COATED ORAL at 17:46

## 2022-05-08 RX ADMIN — PIPERACILLIN AND TAZOBACTAM 25 GRAM(S): 4; .5 INJECTION, POWDER, LYOPHILIZED, FOR SOLUTION INTRAVENOUS at 15:28

## 2022-05-08 RX ADMIN — AMIODARONE HYDROCHLORIDE 200 MILLIGRAM(S): 400 TABLET ORAL at 08:00

## 2022-05-08 RX ADMIN — MIDODRINE HYDROCHLORIDE 10 MILLIGRAM(S): 2.5 TABLET ORAL at 12:17

## 2022-05-08 RX ADMIN — APIXABAN 2.5 MILLIGRAM(S): 2.5 TABLET, FILM COATED ORAL at 08:01

## 2022-05-08 RX ADMIN — MIDODRINE HYDROCHLORIDE 10 MILLIGRAM(S): 2.5 TABLET ORAL at 17:46

## 2022-05-08 RX ADMIN — PIPERACILLIN AND TAZOBACTAM 25 GRAM(S): 4; .5 INJECTION, POWDER, LYOPHILIZED, FOR SOLUTION INTRAVENOUS at 21:15

## 2022-05-08 RX ADMIN — Medication 10 MILLIEQUIVALENT(S): at 12:17

## 2022-05-08 RX ADMIN — Medication 600 MILLIGRAM(S): at 05:49

## 2022-05-08 RX ADMIN — Medication 30 MILLIGRAM(S): at 05:49

## 2022-05-08 RX ADMIN — Medication 600 MILLIGRAM(S): at 17:46

## 2022-05-08 RX ADMIN — PIPERACILLIN AND TAZOBACTAM 25 GRAM(S): 4; .5 INJECTION, POWDER, LYOPHILIZED, FOR SOLUTION INTRAVENOUS at 05:49

## 2022-05-08 RX ADMIN — MIDODRINE HYDROCHLORIDE 10 MILLIGRAM(S): 2.5 TABLET ORAL at 05:49

## 2022-05-08 RX ADMIN — Medication 1 TABLET(S): at 17:46

## 2022-05-08 RX ADMIN — Medication 1 TABLET(S): at 12:16

## 2022-05-08 RX ADMIN — Medication 1 TABLET(S): at 08:00

## 2022-05-08 NOTE — PROGRESS NOTE ADULT - SUBJECTIVE AND OBJECTIVE BOX
SHAZIA PERDOMO is a 89yFemale , patient examined and chart reviewed. Patient seen and examined today being followed for       INTERVAL HPI/ OVERNIGHT EVENTS:   Afebrile. NAD.   No events.    PAST MEDICAL & SURGICAL HISTORY:  Chronic atrial fibrillation  Orthostatic hypotension  COPD (chronic obstructive pulmonary disease)  Pericardial effusion  Pleural effusion  No significant past surgical history      For details regarding the patient's social history, family history, and other miscellaneous elements, please refer the initial infectious diseases consultation and/or the admitting history and physical examination for this admission.    ROS:  Unable to obtain due to : Dementia    Current inpatient medications :    ANTIBIOTICS/RELEVANT:    piperacillin/tazobactam IVPB.. 3.375 Gram(s) IV Intermittent every 8 hours    MEDICATIONS  (STANDING):  aMIOdarone    Tablet 200 milliGRAM(s) Oral daily  apixaban 2.5 milliGRAM(s) Oral every 12 hours  guaiFENesin  milliGRAM(s) Oral every 12 hours  lactobacillus acidophilus 1 Tablet(s) Oral three times a day with meals  midodrine. 10 milliGRAM(s) Oral three times a day  piperacillin/tazobactam IVPB.. 3.375 Gram(s) IV Intermittent every 8 hours  potassium chloride    Tablet ER 10 milliEquivalent(s) Oral daily    MEDICATIONS  (PRN):  acetaminophen     Tablet .. 650 milliGRAM(s) Oral every 6 hours PRN Temp greater or equal to 38C (100.4F), Mild Pain (1 - 3)  albuterol/ipratropium for Nebulization 3 milliLiter(s) Nebulizer every 6 hours PRN Shortness of Breath and/or Wheezing  aluminum hydroxide/magnesium hydroxide/simethicone Suspension 30 milliLiter(s) Oral every 4 hours PRN Dyspepsia  melatonin 3 milliGRAM(s) Oral at bedtime PRN Insomnia  ondansetron Injectable 4 milliGRAM(s) IV Push every 8 hours PRN Nausea and/or Vomiting      Objective:  Vital Signs Last 24 Hrs  T(C): 36.3 (08 May 2022 18:58), Max: 36.4 (08 May 2022 00:28)  T(F): 97.4 (08 May 2022 18:58), Max: 97.5 (08 May 2022 00:28)  HR: 76 (08 May 2022 18:58) (69 - 78)  BP: 102/70 (08 May 2022 18:58) (93/61 - 129/78)  RR: 18 (08 May 2022 18:58) (18 - 24)  SpO2: 94% (08 May 2022 18:58) (92% - 98%)    Physical Exam:  General: no acute distress  Neck: supple, trachea midline  Lungs: Decreased, no wheeze/rhonchi  Cardiovascular: regular rate and rhythm, S1 S2  Abdomen: soft, nontender,  bowel sounds normal  Neurological: awake confused  Skin: no rash  Extremities: no edema      LABS:                        11.3   13.02 )-----------( 352      ( 08 May 2022 06:30 )             35.5   05-    142  |  107  |  11  ----------------------------<  92  3.6   |  26  |  0.45<L>    Ca    8.2<L>      08 May 2022 06:30        Urinalysis Basic - ( 03 May 2022 16:06 )    Color: Yellow / Appearance: Clear / S.015 / pH: x  Gluc: x / Ketone: Negative  / Bili: Negative / Urobili: Negative mg/dL   Blood: x / Protein: 15 mg/dL / Nitrite: Negative   Leuk Esterase: Moderate / RBC: 0-2 /HPF / WBC 6-10   Sq Epi: x / Non Sq Epi: Few / Bacteria: Few    MICROBIOLOGY:  Culture - Urine (22 @ 16:06)    -  Amikacin: S <=16    -  Amoxicillin/Clavulanic Acid: R >16/8    -  Ampicillin: R >16 These ampicillin results predict results for amoxicillin    -  Ampicillin/Sulbactam: S 8/4 Enterobacter, Klebsiella aerogenes, Citrobacter, and Serratia may develop resistance during prolonged therapy (3-4 days)    -  Aztreonam: S <=4    -  Cefazolin: S 16 ([id number]) For uncomplicated UTI with K. pneumoniae, E. coli, or P. mirablis: ALYSE <=16 is sensitive and ALYSE >=32 is resistant. This also predicts results for oral agents cefaclor, cefdinir, cefpodoxime, cefprozil, cefuroxime axetil, cephalexin and locarbef for uncomplicated UTI. Note that some isolates may be susceptible to these agents while testing resistant to cefazolin.    -  Cefepime: S <=2    -  Cefoxitin: I 16    -  Ceftriaxone: S <=1 Enterobacter, Klebsiella aerogenes, Citrobacter, and Serratia may develop resistance during prolonged therapy    -  Ciprofloxacin: R >2    -  Ertapenem: S <=0.5    -  Gentamicin: S <=2    -  Imipenem: S <=1    -  Levofloxacin: R >4    -  Meropenem: S <=1    -  Nitrofurantoin: S <=32 Should not be used to treat pyelonephritis    -  Piperacillin/Tazobactam: S <=8    -  Tigecycline: S <=2    -  Tobramycin: S <=2    -  Trimethoprim/Sulfamethoxazole: S <=0.5/9.5    Specimen Source: Clean Catch Clean Catch (Midstream)    Culture Results:   >100,000 CFU/ml Escherichia coli    Organism Identification: Escherichia coli    Organism: Escherichia coli    Method Type: ALYSE      Culture - Blood (collected 03 May 2022 15:36)  Source: .Blood Blood-Peripheral  Preliminary Report (04 May 2022 23:01):    No growth to date.    Culture - Blood (collected 03 May 2022 15:36)  Source: .Blood Blood-Peripheral  Preliminary Report (04 May 2022 23:01):    No growth to date.    Legionella pneumophila Antigen, Urine (22 @ 16:31)    Legionella Antigen, Urine: Negative    Respiratory Viral Panel with COVID-19 by ALEJANDRA (22 @ 15:36)    Rapid RVP Result: Detected    SARS-CoV-2: NotDetec: This Respiratory Panel uses polymerase chain reaction (PCR) to detect for  adenovirus; coronavirus (HKU1, NL63, 229E, OC43); human metapneumovirus  (hMPV); human enterovirus/rhinovirus (Entero/RV); influenza A; influenza  A/H1; influenza A/H3; influenza A/H1-2009; influenza B; parainfluenza  viruses 1, 2, 3, 4; respiratory syncytial virus; Mycoplasma pneumoniae;  Chlamydophila pneumoniae; and SARS-CoV-2.    Adenovirus (RapRVP): NotDetec    Influenza A (RapRVP): NotDetec    Influenza AH1 2009 (RapRVP): NotDetec    Influenza AH1 (RapRVP): NotDetec    Influenza AH3 (RapRVP): Detected    Influenza B (RapRVP): NotDetec    Parainfluenza 1 (RapRVP): NotDetec    Parainfluenza 2 (RapRVP): NotDetec    Parainfluenza 3 (RapRVP): NotDetec    Parainfluenza 4 (RapRVP): NotDetec    Chlamydia pneumoniae (RapRVP): NotDetec    Mycoplasma pneumoniae (RapRVP): NotDetec    Entero/Rhinovirus (RapRVP): NotDetec    hMPV (RapRVP): NotDetec    Coronavirus (229E,HKU1,NL63,OC43): NotDetec    RADIOLOGY & ADDITIONAL STUDIES:    ACC: [id number] EXAM:  XR CHEST PORTABLE URGENT 1V                          PROCEDURE DATE:  2022          INTERPRETATION:  Sepsis.    AP chest.    Normal heart size. Atherosclerotic aorta. Hyperinflated lungs. Airspace   infiltrates in the right mid and lower lung field and at the left base   consistent with bilateral pneumonia. Trace bilateral pleural effusions.   Question underlying nodular opacity left base. Correlate with chest CT.   Calcified granuloma right apex. Biapical pleural thickening.    IMPRESSION: Bilateral pneumonia. Question underlying nodular opacity left   base. Correlate with chest CT.    Assessment :   89YO F PMH atrial fibrillation, COPD, history of pericardial effusion, pleural effusion in past, orthostatic hypotension, dementia, presented from [facility] admitted with Influenza A with ovidio pneumonia. - probable superimposed bacterial pneumonia.   Asymptomatic bacteruria  WBC downtrending  MBS with dysphagia    Plan :   Completed Tamiflu x 5 days ended today  Cont Zosyn x 5 days  Trend temps and cbc  Asp precautions  Stable from ID standpoint      Continue with present regiment.  Appropriate use of antibiotics and adverse effects reviewed.    > 35 minutes were spent in direct patient care reviewing notes, medications ,labs data/ imaging , discussion with multidisciplinary team.    Thank you for allowing me to participate in care of your patient .    Karthikeyan Dai MD  Infectious Disease  [phone]

## 2022-05-08 NOTE — PHYSICAL THERAPY INITIAL EVALUATION ADULT - PERTINENT HX OF CURRENT PROBLEM, REHAB EVAL
Pt. admitted from SNF with SOB.  Pt. found to be hypoxic in the 70's.  CXR->bilateral PNA.  Pt. also Flu A +.
who was noted to have shortness of breath and low blood pressure at SNF. Patient was sent in to ER. In Er, patient is noted to have elevated WBC, Pneumonia on chest x-ray. She is being admitted for further care.

## 2022-05-08 NOTE — PROGRESS NOTE ADULT - SUBJECTIVE AND OBJECTIVE BOX
Patient is a 89y old  Female who presents with a chief complaint of Shortness of breath, low BP (04 May 2022 12:46)    HPI: 89 years old female with past medical history of atrial fibrillation, COPD, history of pericardial effusion, pleural effusion in past, orthostatic hypotension, dementia, who was noted to have shortness of breath and low blood pressure at SNF. Patient was sent in to ER. In Er, patient is noted to have elevated WBC, Pneumonia on chest x-ray. She is being admitted for further care.     Patient is very poor historian. All information is from patient's chart and NH records.   She denies any chest pain or pressure.   No nausea or vomiting.  (03 May 2022 18:04)    INTERVAL HPI/OVERNIGHT EVENTS:  Chart reviewed, notes reviewed.   Patient seen and examined.  Being followed by following specialists: ID, pulmonary    Consultant(s) Notes Reviewed:  [X] Yes    Care Discussed with Consultants/Other Providers: [X] Yes    05/04/2022 --> Doing slightly better. More awake and alert. Denies any chest pain or pressure. No nausea or vomiting.  05/05/2022 --> Improving slowly. No shortness of breath on rest. No chest pain or pressure. No fever or chills.    05/06/2022 --> Patient is not eating that well today. She is comfortable. Denies any nausea or vomiting. No abdominal pain.   05/07/2022 --> Doing well. No new issues. No chest pain or pressure.     05/08/2022 --> Oral intake remains poor. Patient doesn't want to eat pureed food. She denies any shortness of breath. No fever or chills.     REVIEW OF SYSTEMS: ROS is very limited as patient is very poor historian.   CONSTITUTIONAL: + fatigue  EYES: No eye pain, or discharge  ENMT: No sinus or throat pain  NECK: No pain or stiffness  BREASTS: No pain, masses, or nipple discharge  RESPIRATORY: + cough, + shortness of breath  CARDIOVASCULAR: No chest pain, palpitations, dizziness, or leg swelling  GASTROINTESTINAL: No abdominal or epigastric pain. No nausea, vomiting.  GENITOURINARY: No dysuria, frequency, hematuria, or incontinence  NEUROLOGICAL: No loss of strength, numbness, or tremors  SKIN: No itching, burning, rashes, or lesions   LYMPH NODES: No enlarged glands  ENDOCRINE: No polydipsia or polyuria  MUSCULOSKELETAL: No muscle, back, or extremity pain  PSYCHIATRIC: No depression, anxiety, mood swings.  HEME/LYMPH: No easy bruising, or bleeding gums  ALLERGY AND IMMUNOLOGIC: No hives or eczema    Allergies    No Known Allergies    Intolerances      Home Medications:    MEDICATIONS  (STANDING):  aMIOdarone    Tablet 200 milliGRAM(s) Oral daily  apixaban 2.5 milliGRAM(s) Oral every 12 hours  guaiFENesin  milliGRAM(s) Oral every 12 hours  lactobacillus acidophilus 1 Tablet(s) Oral three times a day with meals  midodrine. 10 milliGRAM(s) Oral three times a day  piperacillin/tazobactam IVPB.. 3.375 Gram(s) IV Intermittent every 8 hours  potassium chloride    Tablet ER 10 milliEquivalent(s) Oral daily    MEDICATIONS  (PRN):  acetaminophen     Tablet .. 650 milliGRAM(s) Oral every 6 hours PRN Temp greater or equal to 38C (100.4F), Mild Pain (1 - 3)  albuterol/ipratropium for Nebulization 3 milliLiter(s) Nebulizer every 6 hours PRN Shortness of Breath and/or Wheezing  aluminum hydroxide/magnesium hydroxide/simethicone Suspension 30 milliLiter(s) Oral every 4 hours PRN Dyspepsia  melatonin 3 milliGRAM(s) Oral at bedtime PRN Insomnia  ondansetron Injectable 4 milliGRAM(s) IV Push every 8 hours PRN Nausea and/or Vomiting    Vital Signs Last 24 Hrs  T(C): 36.3 (08 May 2022 10:47), Max: 36.5 (07 May 2022 14:20)  T(F): 97.3 (08 May 2022 10:47), Max: 97.7 (07 May 2022 14:20)  HR: 69 (08 May 2022 10:47) (69 - 99)  BP: 100/64 (08 May 2022 10:47) (100/64 - 129/78)  BP(mean): --  RR: 24 (08 May 2022 10:47) (17 - 24)  SpO2: 98% (08 May 2022 10:47) (93% - 98%)    PHYSICAL EXAM:  GENERAL: Elderly female in no acute distress.   HEAD:  Atraumatic, Normocephalic  EYES: Pallor +  ENMT: Moist mucous membranes, no lesions  NECK: Supple.  CHEST/LUNG: Decreased breath sounds at bases, occasional rhonchi + R>L  HEART: S1, S2.   ABDOMEN: Soft, Nontender, Nondistended; Bowel sounds present  EXTREMITIES:  2+ Peripheral Pulses, No clubbing, cyanosis, or edema  MS: No joint swelling or deformity.   LYMPH: No lymphadenopathy noted  SKIN: No rashes or lesions  NERVOUS SYSTEM:  No focal deficit.   PSYCH:  Awake and alert but confused.   LABS:                         11.3   13.02 )-----------( 352      ( 08 May 2022 06:30 )             35.5     08 May 2022 06:30    142    |  107    |  11     ----------------------------<  92     3.6     |  26     |  0.45     Ca    8.2        08 May 2022 06:30    05-04 Chol 106 LDL -- HDL 20<L> Trig 147    RADIOLOGY TEST: (IMAGES REVIEWED BY ME)    Imaging Personally Reviewed:  [X] YES      HEALTH ISSUES - PROBLEM Dx:  Sepsis    Pneumonia    Influenza A    Chronic atrial fibrillation    COPD (chronic obstructive pulmonary disease)    Orthostatic hypotension    Prophylactic measure

## 2022-05-08 NOTE — CHART NOTE - NSCHARTNOTEFT_GEN_A_CORE
Assessment:     Pt seen for nutrition follow-up. Per H&P, pt is a "89 years old female with past medical history of atrial fibrillation, COPD, history of pericardial effusion, pleural effusion in past, orthostatic hypotension, dementia, who was noted to have shortness of breath and low blood pressure at SNF. Patient was sent in to ER. In Er, patient is noted to have elevated WBC, Pneumonia on chest x-ray. She is being admitted for further care. Patient is very poor historian. All information is from patient's chart and NH records. She denies any chest pain or pressure. No nausea or vomiting."    Nutrition consult previously ordered for PI stage II or >. Pt also seen for BMI <19. Pt admitted with stage II pressure ulcer to R buttocks. Visited pt this morning, pt resting in bed during time of visit. Observed untouched breakfast tray at pt's bedside. Pt reports dislike of thickened liquids. Discussed current diet order with patient and encouraged po intake of meals, supplements and fluids. Pt previously reported poor appetite. PO intakes 0-25% per RN documentation. Pt needs assistance with feeding. Pt seen by SLP on 5/4 to assess swallow function- recommended puree with moderately thick liquids via single/small cup sips +aspiration precautions. MBSS completed on 5/5- SLP recommended to continue puree diet with moderately thick liquids, as tolerated. Diet order currently active for recommended texture/consistency. Pt also receiving ensure pudding daily, and ensure enlive BID at tolerated consistency for addtl kcal/protein and for variable po intake (350kcal, 20g protein per 8 fl oz). NKFA. No N/V/D/C per chart review. +BM 5/8. CBW on admission 104#. Transfer ht/wt of 69in/105.6#. No edema noted. Pt underweight in appearance, BMI of 15.5; NFPE previously completed. Pt met criteria for severe malnutrition in the context of chronic illness (based on BMI <19, mod/severe loss of subcutaneous fat and muscle loss, increased energy/protein needs; wound healing and PNA). Recommend encouragement and assistance at meal times. Pt previously with hypokalemia; now improved with supplementation. RD will continue to follow-up and monitor pt's nutritional status.    Factors impacting intake: [ ] none [ ] nausea  [ ] vomiting [ ] diarrhea [ ] constipation  [ ]chewing problems [ ] swallowing issues  [X] other: decreased appetite, dislike of diet texture/consistency    Diet Prescription:   Diet, Pureed:   Moderately Thick Liquids (MODTHICKLIQS)  Supplement Feeding Modality:  Oral  Ensure Enlive Cans or Servings Per Day:  1       Frequency:  Two Times a day  Ensure Pudding Cans or Servings Per Day:  1       Frequency:  Daily (05-05-22 @ 15:47)    Intake: poor po intake    Current Weight: Weight (kg): 47.6 (05-03 @ 14:55)  % Weight Change  Adm weight 104#  5/5 110.6#    Pertinent Medications: MEDICATIONS  (STANDING):  aMIOdarone    Tablet 200 milliGRAM(s) Oral daily  apixaban 2.5 milliGRAM(s) Oral every 12 hours  guaiFENesin  milliGRAM(s) Oral every 12 hours  lactobacillus acidophilus 1 Tablet(s) Oral three times a day with meals  midodrine. 10 milliGRAM(s) Oral three times a day  piperacillin/tazobactam IVPB.. 3.375 Gram(s) IV Intermittent every 8 hours  potassium chloride    Tablet ER 10 milliEquivalent(s) Oral daily    MEDICATIONS  (PRN):  acetaminophen     Tablet .. 650 milliGRAM(s) Oral every 6 hours PRN Temp greater or equal to 38C (100.4F), Mild Pain (1 - 3)  albuterol/ipratropium for Nebulization 3 milliLiter(s) Nebulizer every 6 hours PRN Shortness of Breath and/or Wheezing  aluminum hydroxide/magnesium hydroxide/simethicone Suspension 30 milliLiter(s) Oral every 4 hours PRN Dyspepsia  melatonin 3 milliGRAM(s) Oral at bedtime PRN Insomnia  ondansetron Injectable 4 milliGRAM(s) IV Push every 8 hours PRN Nausea and/or Vomiting    Pertinent Labs: 05-08 Na142 mmol/L Glu 92 mg/dL K+ 3.6 mmol/L Cr  0.45 mg/dL<L> BUN 11 mg/dL 05-05 Alb 1.8 g/dL<L> 05-04 Chol 106 mg/dL LDL --    HDL 20 mg/dL<L> Trig 147 mg/dL    CAPILLARY BLOOD GLUCOSE      Skin: stage II pressure ulcer to R buttocks    Estimated Needs:   [X] no change since previous assessment  [ ] recalculated:     Previous Nutrition Diagnosis:   [ ] Inadequate Energy Intake [ ]Inadequate Oral Intake [ ] Excessive Energy Intake   [ ] Underweight [ ] Increased Nutrient Needs [ ] Overweight/Obesity   [ ] Altered GI Function [ ] Unintended Weight Loss [ ] Food & Nutrition Related Knowledge Deficit [X] Malnutrition (severe/chronic)     Nutrition Diagnosis is [X] ongoing  [ ] resolved [ ] not applicable     New Nutrition Diagnosis: [ ] not applicable       Interventions: Continue nutrition care plan, puree diet, moderately thick liquids, ensure pudding daily, ensure enlive BID, asp precautions, encouragement and assistance at mealtimes  Recommend  [ ] Change Diet To:  [ ] Nutrition Supplement  [ ] Nutrition Support  [ ] Other:     Monitoring and Evaluation:   [X] PO intake [ x ] Tolerance to diet prescription [ x ] weights [ x ] labs[ x ] follow up per protocol  [ ] other:

## 2022-05-09 LAB
ANION GAP SERPL CALC-SCNC: 7 MMOL/L — SIGNIFICANT CHANGE UP (ref 5–17)
BASOPHILS # BLD AUTO: 0.05 K/UL — SIGNIFICANT CHANGE UP (ref 0–0.2)
BASOPHILS NFR BLD AUTO: 0.3 % — SIGNIFICANT CHANGE UP (ref 0–2)
BUN SERPL-MCNC: 12 MG/DL — SIGNIFICANT CHANGE UP (ref 7–23)
CALCIUM SERPL-MCNC: 8.5 MG/DL — SIGNIFICANT CHANGE UP (ref 8.4–10.5)
CHLORIDE SERPL-SCNC: 107 MMOL/L — SIGNIFICANT CHANGE UP (ref 96–108)
CO2 SERPL-SCNC: 30 MMOL/L — SIGNIFICANT CHANGE UP (ref 22–31)
CREAT SERPL-MCNC: 0.67 MG/DL — SIGNIFICANT CHANGE UP (ref 0.5–1.3)
EGFR: 84 ML/MIN/1.73M2 — SIGNIFICANT CHANGE UP
EOSINOPHIL # BLD AUTO: 0.02 K/UL — SIGNIFICANT CHANGE UP (ref 0–0.5)
EOSINOPHIL NFR BLD AUTO: 0.1 % — SIGNIFICANT CHANGE UP (ref 0–6)
GLUCOSE SERPL-MCNC: 95 MG/DL — SIGNIFICANT CHANGE UP (ref 70–99)
HCT VFR BLD CALC: 36.9 % — SIGNIFICANT CHANGE UP (ref 34.5–45)
HGB BLD-MCNC: 11.4 G/DL — LOW (ref 11.5–15.5)
IMM GRANULOCYTES NFR BLD AUTO: 1.7 % — HIGH (ref 0–1.5)
LYMPHOCYTES # BLD AUTO: 1.49 K/UL — SIGNIFICANT CHANGE UP (ref 1–3.3)
LYMPHOCYTES # BLD AUTO: 8.9 % — LOW (ref 13–44)
MCHC RBC-ENTMCNC: 30.2 PG — SIGNIFICANT CHANGE UP (ref 27–34)
MCHC RBC-ENTMCNC: 30.9 GM/DL — LOW (ref 32–36)
MCV RBC AUTO: 97.9 FL — SIGNIFICANT CHANGE UP (ref 80–100)
MONOCYTES # BLD AUTO: 0.7 K/UL — SIGNIFICANT CHANGE UP (ref 0–0.9)
MONOCYTES NFR BLD AUTO: 4.2 % — SIGNIFICANT CHANGE UP (ref 2–14)
NEUTROPHILS # BLD AUTO: 14.17 K/UL — HIGH (ref 1.8–7.4)
NEUTROPHILS NFR BLD AUTO: 84.8 % — HIGH (ref 43–77)
NRBC # BLD: 0 /100 WBCS — SIGNIFICANT CHANGE UP (ref 0–0)
PLATELET # BLD AUTO: 370 K/UL — SIGNIFICANT CHANGE UP (ref 150–400)
POTASSIUM SERPL-MCNC: 3.7 MMOL/L — SIGNIFICANT CHANGE UP (ref 3.5–5.3)
POTASSIUM SERPL-SCNC: 3.7 MMOL/L — SIGNIFICANT CHANGE UP (ref 3.5–5.3)
RBC # BLD: 3.77 M/UL — LOW (ref 3.8–5.2)
RBC # FLD: 15.1 % — HIGH (ref 10.3–14.5)
SODIUM SERPL-SCNC: 144 MMOL/L — SIGNIFICANT CHANGE UP (ref 135–145)
WBC # BLD: 16.72 K/UL — HIGH (ref 3.8–10.5)
WBC # FLD AUTO: 16.72 K/UL — HIGH (ref 3.8–10.5)

## 2022-05-09 RX ADMIN — MIDODRINE HYDROCHLORIDE 10 MILLIGRAM(S): 2.5 TABLET ORAL at 05:22

## 2022-05-09 RX ADMIN — MIDODRINE HYDROCHLORIDE 10 MILLIGRAM(S): 2.5 TABLET ORAL at 11:11

## 2022-05-09 RX ADMIN — Medication 600 MILLIGRAM(S): at 05:22

## 2022-05-09 RX ADMIN — PIPERACILLIN AND TAZOBACTAM 25 GRAM(S): 4; .5 INJECTION, POWDER, LYOPHILIZED, FOR SOLUTION INTRAVENOUS at 05:22

## 2022-05-09 RX ADMIN — Medication 1 TABLET(S): at 18:30

## 2022-05-09 RX ADMIN — PIPERACILLIN AND TAZOBACTAM 25 GRAM(S): 4; .5 INJECTION, POWDER, LYOPHILIZED, FOR SOLUTION INTRAVENOUS at 21:57

## 2022-05-09 RX ADMIN — Medication 10 MILLIEQUIVALENT(S): at 11:12

## 2022-05-09 RX ADMIN — APIXABAN 2.5 MILLIGRAM(S): 2.5 TABLET, FILM COATED ORAL at 18:31

## 2022-05-09 RX ADMIN — Medication 600 MILLIGRAM(S): at 18:30

## 2022-05-09 RX ADMIN — MIDODRINE HYDROCHLORIDE 10 MILLIGRAM(S): 2.5 TABLET ORAL at 18:32

## 2022-05-09 RX ADMIN — AMIODARONE HYDROCHLORIDE 200 MILLIGRAM(S): 400 TABLET ORAL at 05:22

## 2022-05-09 RX ADMIN — PIPERACILLIN AND TAZOBACTAM 25 GRAM(S): 4; .5 INJECTION, POWDER, LYOPHILIZED, FOR SOLUTION INTRAVENOUS at 14:49

## 2022-05-09 RX ADMIN — APIXABAN 2.5 MILLIGRAM(S): 2.5 TABLET, FILM COATED ORAL at 05:22

## 2022-05-09 RX ADMIN — Medication 1 TABLET(S): at 08:15

## 2022-05-09 NOTE — PROGRESS NOTE ADULT - SUBJECTIVE AND OBJECTIVE BOX
SHAZIA PERDOMO is a 89yFemale , patient examined and chart reviewed. Patient seen and examined today being followed for       INTERVAL HPI/ OVERNIGHT EVENTS:   Afebrile. NAD. In chair.  No events.    PAST MEDICAL & SURGICAL HISTORY:  Chronic atrial fibrillation  Orthostatic hypotension  COPD (chronic obstructive pulmonary disease)  Pericardial effusion  Pleural effusion  No significant past surgical history      For details regarding the patient's social history, family history, and other miscellaneous elements, please refer the initial infectious diseases consultation and/or the admitting history and physical examination for this admission.    ROS:  Unable to obtain due to : Dementia    Current inpatient medications :    ANTIBIOTICS/RELEVANT:  piperacillin/tazobactam IVPB.. 3.375 Gram(s) IV Intermittent every 8 hours    MEDICATIONS  (STANDING):  aMIOdarone    Tablet 200 milliGRAM(s) Oral daily  apixaban 2.5 milliGRAM(s) Oral every 12 hours  guaiFENesin  milliGRAM(s) Oral every 12 hours  lactobacillus acidophilus 1 Tablet(s) Oral three times a day with meals  midodrine. 10 milliGRAM(s) Oral three times a day  potassium chloride    Tablet ER 10 milliEquivalent(s) Oral daily    MEDICATIONS  (PRN):  acetaminophen     Tablet .. 650 milliGRAM(s) Oral every 6 hours PRN Temp greater or equal to 38C (100.4F), Mild Pain (1 - 3)  albuterol/ipratropium for Nebulization 3 milliLiter(s) Nebulizer every 6 hours PRN Shortness of Breath and/or Wheezing  aluminum hydroxide/magnesium hydroxide/simethicone Suspension 30 milliLiter(s) Oral every 4 hours PRN Dyspepsia  melatonin 3 milliGRAM(s) Oral at bedtime PRN Insomnia  ondansetron Injectable 4 milliGRAM(s) IV Push every 8 hours PRN Nausea and/or Vomiting        Objective:  Vital Signs Last 24 Hrs  T(C): 36.4 (09 May 2022 11:30), Max: 36.4 (09 May 2022 00:40)  T(F): 97.6 (09 May 2022 11:30), Max: 97.6 (09 May 2022 11:30)  HR: 83 (09 May 2022 11:30) (59 - 83)  BP: 96/61 (09 May 2022 11:30) (90/50 - 108/67)  RR: 18 (09 May 2022 11:30) (18 - 24)  SpO2: 95% (09 May 2022 11:30) (92% - 97%)    Physical Exam:  General: no acute distress  Neck: supple, trachea midline  Lungs: Decreased, no wheeze/rhonchi  Cardiovascular: regular rate and rhythm, S1 S2  Abdomen: soft, nontender,  bowel sounds normal  Neurological: awake   Skin: no rash  Extremities: no edema      LABS:                                 11.4   16.72 )-----------( 370      ( 09 May 2022 07:25 )             36.9       144  |  107  |  12  ----------------------------<  95  3.7   |  30  |  0.67    Ca    8.5      09 May 2022 07:25    Urinalysis Basic - ( 03 May 2022 16:06 )    Color: Yellow / Appearance: Clear / S.015 / pH: x  Gluc: x / Ketone: Negative  / Bili: Negative / Urobili: Negative mg/dL   Blood: x / Protein: 15 mg/dL / Nitrite: Negative   Leuk Esterase: Moderate / RBC: 0-2 /HPF / WBC 6-10   Sq Epi: x / Non Sq Epi: Few / Bacteria: Few    MICROBIOLOGY:  Culture - Urine (22 @ 16:06)    -  Amikacin: S <=16    -  Amoxicillin/Clavulanic Acid: R >16/8    -  Ampicillin: R >16 These ampicillin results predict results for amoxicillin    -  Ampicillin/Sulbactam: S 8/4 Enterobacter, Klebsiella aerogenes, Citrobacter, and Serratia may develop resistance during prolonged therapy (3-4 days)    -  Aztreonam: S <=4    -  Cefazolin: S 16 (MIC_CL_COM_ENTERIC_CEFAZU) For uncomplicated UTI with K. pneumoniae, E. coli, or P. mirablis: ALYSE <=16 is sensitive and ALYSE >=32 is resistant. This also predicts results for oral agents cefaclor, cefdinir, cefpodoxime, cefprozil, cefuroxime axetil, cephalexin and locarbef for uncomplicated UTI. Note that some isolates may be susceptible to these agents while testing resistant to cefazolin.    -  Cefepime: S <=2    -  Cefoxitin: I 16    -  Ceftriaxone: S <=1 Enterobacter, Klebsiella aerogenes, Citrobacter, and Serratia may develop resistance during prolonged therapy    -  Ciprofloxacin: R >2    -  Ertapenem: S <=0.5    -  Gentamicin: S <=2    -  Imipenem: S <=1    -  Levofloxacin: R >4    -  Meropenem: S <=1    -  Nitrofurantoin: S <=32 Should not be used to treat pyelonephritis    -  Piperacillin/Tazobactam: S <=8    -  Tigecycline: S <=2    -  Tobramycin: S <=2    -  Trimethoprim/Sulfamethoxazole: S <=0.5/9.5    Specimen Source: Clean Catch Clean Catch (Midstream)    Culture Results:   >100,000 CFU/ml Escherichia coli    Organism Identification: Escherichia coli    Organism: Escherichia coli    Method Type: ALYSE      Culture - Blood (collected 03 May 2022 15:36)  Source: .Blood Blood-Peripheral  Preliminary Report (04 May 2022 23:01):    No growth to date.    Culture - Blood (collected 03 May 2022 15:36)  Source: .Blood Blood-Peripheral  Preliminary Report (04 May 2022 23:01):    No growth to date.    Legionella pneumophila Antigen, Urine (22 @ 16:31)    Legionella Antigen, Urine: Negative    Respiratory Viral Panel with COVID-19 by ALEJANDRA (22 @ 15:36)    Rapid RVP Result: Detected    SARS-CoV-2: NotDetec: This Respiratory Panel uses polymerase chain reaction (PCR) to detect for  adenovirus; coronavirus (HKU1, NL63, 229E, OC43); human metapneumovirus  (hMPV); human enterovirus/rhinovirus (Entero/RV); influenza A; influenza  A/H1; influenza A/H3; influenza A/H1-2009; influenza B; parainfluenza  viruses 1, 2, 3, 4; respiratory syncytial virus; Mycoplasma pneumoniae;  Chlamydophila pneumoniae; and SARS-CoV-2.    Adenovirus (RapRVP): NotDetec    Influenza A (RapRVP): NotDetec    Influenza AH1 2009 (RapRVP): NotDetec    Influenza AH1 (RapRVP): NotDetec    Influenza AH3 (RapRVP): Detected    Influenza B (RapRVP): NotDetec    Parainfluenza 1 (RapRVP): NotDetec    Parainfluenza 2 (RapRVP): NotDetec    Parainfluenza 3 (RapRVP): NotDetec    Parainfluenza 4 (RapRVP): NotDetec    Chlamydia pneumoniae (RapRVP): NotDetec    Mycoplasma pneumoniae (RapRVP): NotDetec    Entero/Rhinovirus (RapRVP): NotDetec    hMPV (RapRVP): NotDetec    Coronavirus (229E,HKU1,NL63,OC43): NotDetec    RADIOLOGY & ADDITIONAL STUDIES:    ACC: [id number] EXAM:  XR CHEST PORTABLE URGENT 1V                          PROCEDURE DATE:  2022          INTERPRETATION:  Sepsis.    AP chest.    Normal heart size. Atherosclerotic aorta. Hyperinflated lungs. Airspace   infiltrates in the right mid and lower lung field and at the left base   consistent with bilateral pneumonia. Trace bilateral pleural effusions.   Question underlying nodular opacity left base. Correlate with chest CT.   Calcified granuloma right apex. Biapical pleural thickening.    IMPRESSION: Bilateral pneumonia. Question underlying nodular opacity left   base. Correlate with chest CT.    Assessment :   88YO F PMH atrial fibrillation, COPD, history of pericardial effusion, pleural effusion in past, orthostatic hypotension, dementia, presented from [facility] admitted with Influenza A with ovidio pneumonia. - probable superimposed bacterial pneumonia.   Asymptomatic bacteruria  WBC fluctuating  MBS with dysphagia    Plan :   Completed Tamiflu x 5 days ended 22  Cont Zosyn x 6/7 days  Trend temps and cbc  Asp precautions  Stable from ID standpoint      Continue with present regiment.  Appropriate use of antibiotics and adverse effects reviewed.    > 35 minutes were spent in direct patient care reviewing notes, medications ,labs data/ imaging , discussion with multidisciplinary team.    Thank you for allowing me to participate in care of your patient .    Karthikeyan Dai MD  Infectious Disease  [phone]

## 2022-05-09 NOTE — PROGRESS NOTE ADULT - SUBJECTIVE AND OBJECTIVE BOX
Patient is a 89y old  Female who presents with a chief complaint of Shortness of breath, low BP (04 May 2022 12:46)    HPI: 89 years old female with past medical history of atrial fibrillation, COPD, history of pericardial effusion, pleural effusion in past, orthostatic hypotension, dementia, who was noted to have shortness of breath and low blood pressure at SNF. Patient was sent in to ER. In Er, patient is noted to have elevated WBC, Pneumonia on chest x-ray. She is being admitted for further care.     Patient is very poor historian. All information is from patient's chart and NH records.   She denies any chest pain or pressure.   No nausea or vomiting.  (03 May 2022 18:04)    INTERVAL HPI/OVERNIGHT EVENTS:  Chart reviewed, notes reviewed.   Patient seen and examined.  Being followed by following specialists: ID, pulmonary    Consultant(s) Notes Reviewed:  [X] Yes    Care Discussed with Consultants/Other Providers: [X] Yes    05/04/2022 --> Doing slightly better. More awake and alert. Denies any chest pain or pressure. No nausea or vomiting.  05/05/2022 --> Improving slowly. No shortness of breath on rest. No chest pain or pressure. No fever or chills.    05/06/2022 --> Patient is not eating that well today. She is comfortable. Denies any nausea or vomiting. No abdominal pain.   05/07/2022 --> Doing well. No new issues. No chest pain or pressure.   05/08/2022 --> Oral intake remains poor. Patient doesn't want to eat pureed food. She denies any shortness of breath. No fever or chills.     05/09/2022 --> No new issues. Doesn't want pureed diet. No chest pain or pressure. + cough.     REVIEW OF SYSTEMS: ROS is very limited as patient is very poor historian.   CONSTITUTIONAL: + fatigue  EYES: No eye pain, or discharge  ENMT: No sinus or throat pain  NECK: No pain or stiffness  BREASTS: No pain, masses, or nipple discharge  RESPIRATORY: + cough, + shortness of breath  CARDIOVASCULAR: No chest pain, palpitations, dizziness, or leg swelling  GASTROINTESTINAL: No abdominal or epigastric pain. No nausea, vomiting.  GENITOURINARY: No dysuria, frequency, hematuria, or incontinence  NEUROLOGICAL: No loss of strength, numbness, or tremors  SKIN: No itching, burning, rashes, or lesions   LYMPH NODES: No enlarged glands  ENDOCRINE: No polydipsia or polyuria  MUSCULOSKELETAL: No muscle, back, or extremity pain  PSYCHIATRIC: No depression, anxiety, mood swings.  HEME/LYMPH: No easy bruising, or bleeding gums  ALLERGY AND IMMUNOLOGIC: No hives or eczema    Allergies    No Known Allergies    Intolerances      Home Medications:    MEDICATIONS  (STANDING):  aMIOdarone    Tablet 200 milliGRAM(s) Oral daily  apixaban 2.5 milliGRAM(s) Oral every 12 hours  guaiFENesin  milliGRAM(s) Oral every 12 hours  lactobacillus acidophilus 1 Tablet(s) Oral three times a day with meals  midodrine. 10 milliGRAM(s) Oral three times a day  piperacillin/tazobactam IVPB.. 3.375 Gram(s) IV Intermittent every 8 hours  potassium chloride    Tablet ER 10 milliEquivalent(s) Oral daily    MEDICATIONS  (PRN):  acetaminophen     Tablet .. 650 milliGRAM(s) Oral every 6 hours PRN Temp greater or equal to 38C (100.4F), Mild Pain (1 - 3)  albuterol/ipratropium for Nebulization 3 milliLiter(s) Nebulizer every 6 hours PRN Shortness of Breath and/or Wheezing  aluminum hydroxide/magnesium hydroxide/simethicone Suspension 30 milliLiter(s) Oral every 4 hours PRN Dyspepsia  melatonin 3 milliGRAM(s) Oral at bedtime PRN Insomnia  ondansetron Injectable 4 milliGRAM(s) IV Push every 8 hours PRN Nausea and/or Vomiting    Vital Signs Last 24 Hrs  T(C): 36.4 (09 May 2022 14:55), Max: 36.4 (09 May 2022 00:40)  T(F): 97.6 (09 May 2022 14:55), Max: 97.6 (09 May 2022 11:30)  HR: 93 (09 May 2022 14:55) (59 - 93)  BP: 98/71 (09 May 2022 14:55) (90/50 - 108/67)  BP(mean): --  RR: 18 (09 May 2022 14:55) (18 - 18)  SpO2: 95% (09 May 2022 14:55) (94% - 97%)    PHYSICAL EXAM:  GENERAL: Elderly female in no acute distress.   HEAD:  Atraumatic, Normocephalic  EYES: Pallor +  ENMT: Moist mucous membranes, no lesions  NECK: Supple.  CHEST/LUNG: Decreased breath sounds at bases, occasional rhonchi + R>L  HEART: S1, S2.   ABDOMEN: Soft, Nontender, Nondistended; Bowel sounds present  EXTREMITIES:  2+ Peripheral Pulses, No clubbing, cyanosis, or edema  MS: No joint swelling or deformity.   LYMPH: No lymphadenopathy noted  SKIN: No rashes or lesions  NERVOUS SYSTEM:  No focal deficit.   PSYCH:  Awake and alert but confused.   LABS:                         11.4   16.72 )-----------( 370      ( 09 May 2022 07:25 )             36.9     09 May 2022 07:25    144    |  107    |  12     ----------------------------<  95     3.7     |  30     |  0.67     Ca    8.5        09 May 2022 07:25    05-04 Chol 106 LDL -- HDL 20<L> Trig 147    RADIOLOGY TEST: (IMAGES REVIEWED BY ME)    Imaging Personally Reviewed:  [X] YES      HEALTH ISSUES - PROBLEM Dx:  Sepsis    Pneumonia    Influenza A    Chronic atrial fibrillation    COPD (chronic obstructive pulmonary disease)    Orthostatic hypotension    Prophylactic measure

## 2022-05-10 ENCOUNTER — TRANSCRIPTION ENCOUNTER (OUTPATIENT)
Age: 87
End: 2022-05-10

## 2022-05-10 VITALS
SYSTOLIC BLOOD PRESSURE: 102 MMHG | RESPIRATION RATE: 18 BRPM | DIASTOLIC BLOOD PRESSURE: 64 MMHG | OXYGEN SATURATION: 95 % | TEMPERATURE: 98 F | HEART RATE: 80 BPM

## 2022-05-10 LAB — SARS-COV-2 RNA SPEC QL NAA+PROBE: SIGNIFICANT CHANGE UP

## 2022-05-10 PROCEDURE — 85730 THROMBOPLASTIN TIME PARTIAL: CPT

## 2022-05-10 PROCEDURE — 85027 COMPLETE CBC AUTOMATED: CPT

## 2022-05-10 PROCEDURE — 92611 MOTION FLUOROSCOPY/SWALLOW: CPT

## 2022-05-10 PROCEDURE — 87040 BLOOD CULTURE FOR BACTERIA: CPT

## 2022-05-10 PROCEDURE — 36415 COLL VENOUS BLD VENIPUNCTURE: CPT

## 2022-05-10 PROCEDURE — 85610 PROTHROMBIN TIME: CPT

## 2022-05-10 PROCEDURE — 85025 COMPLETE CBC W/AUTO DIFF WBC: CPT

## 2022-05-10 PROCEDURE — 83880 ASSAY OF NATRIURETIC PEPTIDE: CPT

## 2022-05-10 PROCEDURE — 87186 SC STD MICRODIL/AGAR DIL: CPT

## 2022-05-10 PROCEDURE — 97161 PT EVAL LOW COMPLEX 20 MIN: CPT

## 2022-05-10 PROCEDURE — 80061 LIPID PANEL: CPT

## 2022-05-10 PROCEDURE — 99285 EMERGENCY DEPT VISIT HI MDM: CPT

## 2022-05-10 PROCEDURE — 87635 SARS-COV-2 COVID-19 AMP PRB: CPT

## 2022-05-10 PROCEDURE — 97530 THERAPEUTIC ACTIVITIES: CPT

## 2022-05-10 PROCEDURE — 80048 BASIC METABOLIC PNL TOTAL CA: CPT

## 2022-05-10 PROCEDURE — 83036 HEMOGLOBIN GLYCOSYLATED A1C: CPT

## 2022-05-10 PROCEDURE — 71045 X-RAY EXAM CHEST 1 VIEW: CPT

## 2022-05-10 PROCEDURE — 92610 EVALUATE SWALLOWING FUNCTION: CPT

## 2022-05-10 PROCEDURE — 84484 ASSAY OF TROPONIN QUANT: CPT

## 2022-05-10 PROCEDURE — 84145 PROCALCITONIN (PCT): CPT

## 2022-05-10 PROCEDURE — 81001 URINALYSIS AUTO W/SCOPE: CPT

## 2022-05-10 PROCEDURE — 80053 COMPREHEN METABOLIC PANEL: CPT

## 2022-05-10 PROCEDURE — 87086 URINE CULTURE/COLONY COUNT: CPT

## 2022-05-10 PROCEDURE — 87449 NOS EACH ORGANISM AG IA: CPT

## 2022-05-10 PROCEDURE — 93005 ELECTROCARDIOGRAM TRACING: CPT

## 2022-05-10 PROCEDURE — 83605 ASSAY OF LACTIC ACID: CPT

## 2022-05-10 PROCEDURE — 83735 ASSAY OF MAGNESIUM: CPT

## 2022-05-10 PROCEDURE — 0225U NFCT DS DNA&RNA 21 SARSCOV2: CPT

## 2022-05-10 PROCEDURE — 74230 X-RAY XM SWLNG FUNCJ C+: CPT

## 2022-05-10 PROCEDURE — 97116 GAIT TRAINING THERAPY: CPT

## 2022-05-10 PROCEDURE — 97110 THERAPEUTIC EXERCISES: CPT

## 2022-05-10 PROCEDURE — 96365 THER/PROPH/DIAG IV INF INIT: CPT

## 2022-05-10 RX ORDER — LACTOBACILLUS ACIDOPHILUS 100MM CELL
1 CAPSULE ORAL
Qty: 0 | Refills: 0 | DISCHARGE
Start: 2022-05-10

## 2022-05-10 RX ORDER — AMIODARONE HYDROCHLORIDE 400 MG/1
1 TABLET ORAL
Qty: 0 | Refills: 0 | DISCHARGE
Start: 2022-05-10

## 2022-05-10 RX ORDER — IPRATROPIUM/ALBUTEROL SULFATE 18-103MCG
3 AEROSOL WITH ADAPTER (GRAM) INHALATION
Qty: 0 | Refills: 0 | DISCHARGE
Start: 2022-05-10

## 2022-05-10 RX ORDER — MIDODRINE HYDROCHLORIDE 2.5 MG/1
1 TABLET ORAL
Qty: 0 | Refills: 0 | DISCHARGE
Start: 2022-05-10

## 2022-05-10 RX ORDER — APIXABAN 2.5 MG/1
1 TABLET, FILM COATED ORAL
Qty: 0 | Refills: 0 | DISCHARGE
Start: 2022-05-10

## 2022-05-10 RX ORDER — ACETAMINOPHEN 500 MG
2 TABLET ORAL
Qty: 0 | Refills: 0 | DISCHARGE
Start: 2022-05-10

## 2022-05-10 RX ORDER — LANOLIN ALCOHOL/MO/W.PET/CERES
1 CREAM (GRAM) TOPICAL
Qty: 0 | Refills: 0 | DISCHARGE
Start: 2022-05-10

## 2022-05-10 RX ORDER — POTASSIUM CHLORIDE 20 MEQ
1 PACKET (EA) ORAL
Qty: 0 | Refills: 0 | DISCHARGE
Start: 2022-05-10

## 2022-05-10 RX ADMIN — AMIODARONE HYDROCHLORIDE 200 MILLIGRAM(S): 400 TABLET ORAL at 06:17

## 2022-05-10 RX ADMIN — MIDODRINE HYDROCHLORIDE 10 MILLIGRAM(S): 2.5 TABLET ORAL at 14:10

## 2022-05-10 RX ADMIN — APIXABAN 2.5 MILLIGRAM(S): 2.5 TABLET, FILM COATED ORAL at 06:17

## 2022-05-10 RX ADMIN — MIDODRINE HYDROCHLORIDE 10 MILLIGRAM(S): 2.5 TABLET ORAL at 06:17

## 2022-05-10 RX ADMIN — Medication 1 TABLET(S): at 14:10

## 2022-05-10 RX ADMIN — PIPERACILLIN AND TAZOBACTAM 25 GRAM(S): 4; .5 INJECTION, POWDER, LYOPHILIZED, FOR SOLUTION INTRAVENOUS at 06:18

## 2022-05-10 RX ADMIN — Medication 10 MILLIEQUIVALENT(S): at 14:10

## 2022-05-10 RX ADMIN — Medication 600 MILLIGRAM(S): at 06:18

## 2022-05-10 NOTE — PROGRESS NOTE ADULT - PROBLEM SELECTOR PROBLEM 5
Dysphagia
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
Dysphagia
Dysphagia
COPD (chronic obstructive pulmonary disease)
Dysphagia

## 2022-05-10 NOTE — PROGRESS NOTE ADULT - SUBJECTIVE AND OBJECTIVE BOX
PULMONARY/CRITICAL CARE  DOS 5/10/22  Doing well.  Unchanged.   No fever. Sats ok.     Pt. is poor historian.  Patient is a 89y old  Female who presents with a chief complaint of Shortness of breath, low BP (03 May 2022 18:04)/ right pneumonia.    BRIEF HOSPITAL COURSE: ***· Chief Complaint:  89y Female complaining of shortness of breath.  · HPI Objective Statement: 88 y/o female with PMHx orthostatic hypotension and A FIb BIBA from SNF due to SOB. As per EMS, pt found to be hypoxic in the 70s. As per Dr. harding, reports patient on augmentin for pneumonia and flu outbreak in SNF. pt denies chest pain, SOB, fever, vomiting, abd pain, leg swelling, or any other complaints.  She claims she has had influenza vaccine.     Events last 24 hours: ***    PAST MEDICAL & SURGICAL HISTORY:  Chronic atrial fibrillation    Orthostatic hypotension    COPD (chronic obstructive pulmonary disease)    Pericardial effusion    Pleural effusion    No significant past surgical history      Allergies    No Known Allergies    Intolerances      FAMILY HISTORY/ social: denies cigs, etoh          Medications:  oseltamivir 30 milliGRAM(s) Oral two times a day  piperacillin/tazobactam IVPB.. 3.375 Gram(s) IV Intermittent every 8 hours    aMIOdarone    Tablet 200 milliGRAM(s) Oral daily  midodrine. 10 milliGRAM(s) Oral three times a day    albuterol/ipratropium for Nebulization 3 milliLiter(s) Nebulizer every 6 hours PRN  guaiFENesin  milliGRAM(s) Oral every 12 hours    acetaminophen     Tablet .. 650 milliGRAM(s) Oral every 6 hours PRN  melatonin 3 milliGRAM(s) Oral at bedtime PRN  ondansetron Injectable 4 milliGRAM(s) IV Push every 8 hours PRN      apixaban 2.5 milliGRAM(s) Oral every 12 hours    aluminum hydroxide/magnesium hydroxide/simethicone Suspension 30 milliLiter(s) Oral every 4 hours PRN        potassium chloride  20 mEq/100 mL IVPB 20 milliEquivalent(s) IV Intermittent every 2 hours  sodium chloride 0.9%. 1000 milliLiter(s) IV Continuous <Continuous>        lactobacillus acidophilus 1 Tablet(s) Oral three times a day with meals          ICU Vital Signs Last 24 Hrs  T(C): 36.5 (04 May 2022 05:00), Max: 37.8 (03 May 2022 14:55)  T(F): 97.7 (04 May 2022 05:00), Max: 100 (03 May 2022 14:55)  HR: 62 (04 May 2022 05:00) (60 - 72)  BP: 97/54 (04 May 2022 05:00) (91/55 - 113/58)  BP(mean): 67 (04 May 2022 05:00) (67 - 74)  ABP: --  ABP(mean): --  RR: 18 (04 May 2022 05:00) (18 - 22)  SpO2: 96% (04 May 2022 05:00) (90% - 98%)    Vital Signs Last 24 Hrs  T(C): 36.5 (04 May 2022 05:00), Max: 37.8 (03 May 2022 14:55)  T(F): 97.7 (04 May 2022 05:00), Max: 100 (03 May 2022 14:55)  HR: 62 (04 May 2022 05:00) (60 - 72)  BP: 97/54 (04 May 2022 05:00) (91/55 - 113/58)  BP(mean): 67 (04 May 2022 05:00) (67 - 74)  RR: 18 (04 May 2022 05:00) (18 - 22)  SpO2: 96% (04 May 2022 05:00) (90% - 98%)        I&O's Detail        LABS:                        11.0   17.28 )-----------( 295      ( 04 May 2022 05:30 )             33.7     05-04    140  |  103  |  39<H>  ----------------------------<  117<H>  2.9<LL>   |  30  |  0.93    Ca    8.2<L>      04 May 2022 05:30    TPro  6.3  /  Alb  1.6<L>  /  TBili  0.4  /  DBili  x   /  AST  38  /  ALT  33  /  AlkPhos  78  05-04          CAPILLARY BLOOD GLUCOSE        PT/INR - ( 03 May 2022 15:36 )   PT: 24.5 sec;   INR: 2.11 ratio         PTT - ( 03 May 2022 15:36 )  PTT:30.9 sec  Urinalysis Basic - ( 03 May 2022 16:06 )    Color: Yellow / Appearance: Clear / S.015 / pH: x  Gluc: x / Ketone: Negative  / Bili: Negative / Urobili: Negative mg/dL   Blood: x / Protein: 15 mg/dL / Nitrite: Negative   Leuk Esterase: Moderate / RBC: 0-2 /HPF / WBC 6-10   Sq Epi: x / Non Sq Epi: Few / Bacteria: Few      CULTURES:      Physical Examination:    General: No acute distress.  thin elderly female sitting up comfortably    HEENT: Pupils equal, reactive to light.  Symmetric. poor dentition    PULM: Clear to auscultation bilaterally, no wheeze rhonchi rales no change percussion    CVS: irregular rate and rhythm, no murmurs, rubs, or gallops    ABD: Soft, nondistended, nontender, normoactive bowel sounds, no masses    EXT: No edema, nontender calves    SKIN: Warm and well perfused, no rashes noted.    NEURO: Alert, somewhat confused, interactive, nonfocal    RADIOLOGY: ***CXR RLL / left base infiltrae    CRITICAL CARE TIME SPENT: ***

## 2022-05-10 NOTE — PROGRESS NOTE ADULT - PROBLEM SELECTOR PROBLEM 4
Chronic atrial fibrillation
Hypokalemia

## 2022-05-10 NOTE — DISCHARGE NOTE PROVIDER - NSDCMRMEDTOKEN_GEN_ALL_CORE_FT
acetaminophen 325 mg oral tablet: 2 tab(s) orally every 6 hours, As needed, Temp greater or equal to 38C (100.4F), Mild Pain (1 - 3)  aluminum hydroxide-magnesium hydroxide 200 mg-200 mg/5 mL oral suspension: 30 milliliter(s) orally every 4 hours, As needed, Dyspepsia  amiodarone 200 mg oral tablet: 1 tab(s) orally once a day  apixaban 2.5 mg oral tablet: 1 tab(s) orally every 12 hours  Augmentin 500 mg-125 mg oral tablet: 1 tab(s) orally every 8 hours for 3 days  guaiFENesin 600 mg oral tablet, extended release: 1 tab(s) orally every 12 hours  ipratropium-albuterol 0.5 mg-2.5 mg/3 mL inhalation solution: 3 milliliter(s) inhaled every 6 hours, As needed, Shortness of Breath and/or Wheezing  lactobacillus acidophilus oral capsule: 1 tab(s) orally 3 times a day for 14 days  melatonin 3 mg oral tablet: 1 tab(s) orally once a day (at bedtime), As needed, Insomnia  midodrine 10 mg oral tablet: 1 tab(s) orally 3 times a day  potassium chloride 10 mEq oral tablet, extended release: 1 tab(s) orally once a day

## 2022-05-10 NOTE — PROGRESS NOTE ADULT - PROBLEM SELECTOR PROBLEM 8
COPD (chronic obstructive pulmonary disease)
Chronic atrial fibrillation

## 2022-05-10 NOTE — DISCHARGE NOTE PROVIDER - DETAILS OF MALNUTRITION DIAGNOSIS/DIAGNOSES
This patient has been assessed with a concern for Malnutrition and was treated during this hospitalization for the following Nutrition diagnosis/diagnoses:     -  05/05/2022: Severe protein-calorie malnutrition   -  05/05/2022: Underweight (BMI < 19)

## 2022-05-10 NOTE — PROGRESS NOTE ADULT - SUBJECTIVE AND OBJECTIVE BOX
Patient is a 89y old  Female who presents with a chief complaint of Shortness of breath, low BP (04 May 2022 12:46)    HPI: 89 years old female with past medical history of atrial fibrillation, COPD, history of pericardial effusion, pleural effusion in past, orthostatic hypotension, dementia, who was noted to have shortness of breath and low blood pressure at SNF. Patient was sent in to ER. In Er, patient is noted to have elevated WBC, Pneumonia on chest x-ray. She is being admitted for further care.     Patient is very poor historian. All information is from patient's chart and NH records.   She denies any chest pain or pressure.   No nausea or vomiting.  (03 May 2022 18:04)    INTERVAL HPI/OVERNIGHT EVENTS:  Chart reviewed, notes reviewed.   Patient seen and examined.  Being followed by following specialists: ID, pulmonary    Consultant(s) Notes Reviewed:  [X] Yes    Care Discussed with Consultants/Other Providers: [X] Yes    05/04/2022 --> Doing slightly better. More awake and alert. Denies any chest pain or pressure. No nausea or vomiting.  05/05/2022 --> Improving slowly. No shortness of breath on rest. No chest pain or pressure. No fever or chills.    05/06/2022 --> Patient is not eating that well today. She is comfortable. Denies any nausea or vomiting. No abdominal pain.   05/07/2022 --> Doing well. No new issues. No chest pain or pressure.   05/08/2022 --> Oral intake remains poor. Patient doesn't want to eat pureed food. She denies any shortness of breath. No fever or chills.   05/09/2022 --> No new issues. Doesn't want pureed diet. No chest pain or pressure. + cough.     05/10/2022 --> Doing well. No new issues. Still not eating well.     REVIEW OF SYSTEMS: ROS is very limited as patient is very poor historian.   CONSTITUTIONAL: + fatigue  EYES: No eye pain, or discharge  ENMT: No sinus or throat pain  NECK: No pain or stiffness  BREASTS: No pain, masses, or nipple discharge  RESPIRATORY: + cough, + shortness of breath  CARDIOVASCULAR: No chest pain, palpitations, dizziness, or leg swelling  GASTROINTESTINAL: No abdominal or epigastric pain. No nausea, vomiting.  GENITOURINARY: No dysuria, frequency, hematuria, or incontinence  NEUROLOGICAL: No loss of strength, numbness, or tremors  SKIN: No itching, burning, rashes, or lesions   LYMPH NODES: No enlarged glands  ENDOCRINE: No polydipsia or polyuria  MUSCULOSKELETAL: No muscle, back, or extremity pain  PSYCHIATRIC: No depression, anxiety, mood swings.  HEME/LYMPH: No easy bruising, or bleeding gums  ALLERGY AND IMMUNOLOGIC: No hives or eczema    Allergies    No Known Allergies    Intolerances      Home Medications:    MEDICATIONS  (STANDING):  aMIOdarone    Tablet 200 milliGRAM(s) Oral daily  apixaban 2.5 milliGRAM(s) Oral every 12 hours  guaiFENesin  milliGRAM(s) Oral every 12 hours  lactobacillus acidophilus 1 Tablet(s) Oral three times a day with meals  midodrine. 10 milliGRAM(s) Oral three times a day  piperacillin/tazobactam IVPB.. 3.375 Gram(s) IV Intermittent every 8 hours  potassium chloride    Tablet ER 10 milliEquivalent(s) Oral daily    MEDICATIONS  (PRN):  acetaminophen     Tablet .. 650 milliGRAM(s) Oral every 6 hours PRN Temp greater or equal to 38C (100.4F), Mild Pain (1 - 3)  albuterol/ipratropium for Nebulization 3 milliLiter(s) Nebulizer every 6 hours PRN Shortness of Breath and/or Wheezing  aluminum hydroxide/magnesium hydroxide/simethicone Suspension 30 milliLiter(s) Oral every 4 hours PRN Dyspepsia  melatonin 3 milliGRAM(s) Oral at bedtime PRN Insomnia  ondansetron Injectable 4 milliGRAM(s) IV Push every 8 hours PRN Nausea and/or Vomiting    Vital Signs Last 24 Hrs  T(C): 36.6 (10 May 2022 09:58), Max: 36.6 (10 May 2022 09:58)  T(F): 97.9 (10 May 2022 09:58), Max: 97.9 (10 May 2022 09:58)  HR: 77 (10 May 2022 09:58) (77 - 93)  BP: 112/74 (10 May 2022 09:58) (98/71 - 135/78)  BP(mean): --  RR: 18 (10 May 2022 09:58) (17 - 18)  SpO2: 93% (10 May 2022 09:58) (91% - 95%)    PHYSICAL EXAM:  GENERAL: Elderly female in no acute distress.   HEAD:  Atraumatic, Normocephalic  EYES: Pallor +  ENMT: Moist mucous membranes, no lesions  NECK: Supple.  CHEST/LUNG: Decreased breath sounds at bases, occasional rhonchi + R>L  HEART: S1, S2.   ABDOMEN: Soft, Nontender, Nondistended; Bowel sounds present  EXTREMITIES:  2+ Peripheral Pulses, No clubbing, cyanosis, or edema  MS: No joint swelling or deformity.   LYMPH: No lymphadenopathy noted  SKIN: No rashes or lesions  NERVOUS SYSTEM:  No focal deficit.   PSYCH:  Awake and alert but confused.   LABS:                               11.4   16.72 )-----------( 370      ( 09 May 2022 07:25 )             36.9     09 May 2022 07:25    144    |  107    |  12     ----------------------------<  95     3.7     |  30     |  0.67     Ca    8.5        09 May 2022 07:25    05-04 Chol 106 LDL -- HDL 20<L> Trig 147    RADIOLOGY TEST: (IMAGES REVIEWED BY ME)    Imaging Personally Reviewed:  [X] YES      HEALTH ISSUES - PROBLEM Dx:  Sepsis    Pneumonia    Influenza A    Chronic atrial fibrillation    COPD (chronic obstructive pulmonary disease)    Orthostatic hypotension    Prophylactic measure

## 2022-05-10 NOTE — PROGRESS NOTE ADULT - PROBLEM SELECTOR PROBLEM 3
Orthostatic hypotension
Influenza A
Influenza A
Orthostatic hypotension
Orthostatic hypotension
Influenza A

## 2022-05-10 NOTE — PROGRESS NOTE ADULT - PROBLEM SELECTOR PROBLEM 7
Prophylactic measure
Chronic atrial fibrillation
UTI (urinary tract infection)

## 2022-05-10 NOTE — PROGRESS NOTE ADULT - PROBLEM SELECTOR PLAN 8
Continue Amiodarone.   Anticoagulated with Eliquis.
DuoNeb nebulizations as needed.   Oxygen supplementation.

## 2022-05-10 NOTE — PROGRESS NOTE ADULT - PROBLEM SELECTOR PROBLEM 2
COPD (chronic obstructive pulmonary disease)
Pneumonia
COPD (chronic obstructive pulmonary disease)
COPD (chronic obstructive pulmonary disease)
Pneumonia

## 2022-05-10 NOTE — DISCHARGE NOTE PROVIDER - CARE PROVIDER_API CALL
Tomas Ramirez; MBBS)  Internal Medicine  [address] [place] [address]  Phone: [phone]  Fax: [phone]  Established Patient  Follow Up Time: 1-3 days

## 2022-05-10 NOTE — PROGRESS NOTE ADULT - PROBLEM SELECTOR PLAN 10
Anticoagulated on Eliquis, no additional prophylaxis needed.
Continue Midodrine.

## 2022-05-10 NOTE — PROGRESS NOTE ADULT - PROBLEM SELECTOR PROBLEM 9
COPD (chronic obstructive pulmonary disease)
Orthostatic hypotension

## 2022-05-10 NOTE — PROGRESS NOTE ADULT - PROBLEM SELECTOR PLAN 11
Anticoagulated on Eliquis, no additional prophylaxis needed.

## 2022-05-10 NOTE — PROGRESS NOTE ADULT - NSPROGADDITIONALINFOA_GEN_ALL_CORE
Prognosis guarded.     Patient is DNR/DNI
Prognosis guarded.     Patient is DNR/DNI.    Discussed with son Syed on phone in detail.
Prognosis guarded.     Discussed with patient's son Syed on phone in detail.
Prognosis guarded.     Patient is DNR/DNI.    Discussed with gisselle Lynch on phone in detail.    Will discharge to SNF for LTC.     I spent more than 35 mins on discharging the patient.
Prognosis guarded.     Discussed with patient's son Syed on phone in detail.

## 2022-05-10 NOTE — PROGRESS NOTE ADULT - PROBLEM/PLAN-11
DISPLAY PLAN FREE TEXT
no distention/bowel sounds normal/nontender/soft

## 2022-05-10 NOTE — PROGRESS NOTE ADULT - PROVIDER SPECIALTY LIST ADULT
Infectious Disease
Internal Medicine
Infectious Disease
Infectious Disease
Internal Medicine
Infectious Disease
Internal Medicine
Pulmonology
Pulmonology
Internal Medicine
Pulmonology
Internal Medicine

## 2022-05-10 NOTE — PROGRESS NOTE ADULT - PROBLEM SELECTOR PROBLEM 6
Orthostatic hypotension
Severe protein-calorie malnutrition
UTI (urinary tract infection)
Severe protein-calorie malnutrition

## 2022-05-10 NOTE — DISCHARGE NOTE NURSING/CASE MANAGEMENT/SOCIAL WORK - PATIENT PORTAL LINK FT
You can access the FollowMyHealth Patient Portal offered by [facility] by registering at the following website: [email or web address]. By joining [facility]s FollowMyHealth portal, you will also be able to view your health information using other applications (apps) compatible with our system.

## 2022-05-10 NOTE — PROGRESS NOTE ADULT - NUTRITIONAL ASSESSMENT
This patient has been assessed with a concern for Malnutrition and has been determined to have a diagnosis/diagnoses of Severe protein-calorie malnutrition and Underweight (BMI < 19).    This patient is being managed with:   Diet Pureed-  Moderately Thick Liquids (MODTHICKLIQS)  Supplement Feeding Modality:  Oral  Ensure Enlive Cans or Servings Per Day:  1       Frequency:  Two Times a day  Ensure Pudding Cans or Servings Per Day:  1       Frequency:  Daily  Entered: May  5 2022  3:46PM    

## 2022-05-10 NOTE — PROGRESS NOTE ADULT - SUBJECTIVE AND OBJECTIVE BOX
SHAZIA PERDOMO is a 89yFemale , patient examined and chart reviewed.     INTERVAL HPI/ OVERNIGHT EVENTS:   Afebrile. NAD. In chair.  No events.    PAST MEDICAL & SURGICAL HISTORY:  Chronic atrial fibrillation  Orthostatic hypotension  COPD (chronic obstructive pulmonary disease)  Pericardial effusion  Pleural effusion  No significant past surgical history      For details regarding the patient's social history, family history, and other miscellaneous elements, please refer the initial infectious diseases consultation and/or the admitting history and physical examination for this admission.    ROS:  Unable to obtain due to : Dementia    Current inpatient medications :    ANTIBIOTICS/RELEVANT:  piperacillin/tazobactam IVPB.. 3.375 Gram(s) IV Intermittent every 8 hours    MEDICATIONS  (STANDING):  aMIOdarone    Tablet 200 milliGRAM(s) Oral daily  apixaban 2.5 milliGRAM(s) Oral every 12 hours  guaiFENesin  milliGRAM(s) Oral every 12 hours  lactobacillus acidophilus 1 Tablet(s) Oral three times a day with meals  midodrine. 10 milliGRAM(s) Oral three times a day  potassium chloride    Tablet ER 10 milliEquivalent(s) Oral daily    MEDICATIONS  (PRN):  acetaminophen     Tablet .. 650 milliGRAM(s) Oral every 6 hours PRN Temp greater or equal to 38C (100.4F), Mild Pain (1 - 3)  albuterol/ipratropium for Nebulization 3 milliLiter(s) Nebulizer every 6 hours PRN Shortness of Breath and/or Wheezing  aluminum hydroxide/magnesium hydroxide/simethicone Suspension 30 milliLiter(s) Oral every 4 hours PRN Dyspepsia  melatonin 3 milliGRAM(s) Oral at bedtime PRN Insomnia  ondansetron Injectable 4 milliGRAM(s) IV Push every 8 hours PRN Nausea and/or Vomiting      Objective:  Vital Signs Last 24 Hrs  T(C): 36.6 (10 May 2022 09:58), Max: 36.6 (10 May 2022 09:58)  T(F): 97.9 (10 May 2022 09:58), Max: 97.9 (10 May 2022 09:58)  HR: 77 (10 May 2022 09:58) (77 - 93)  BP: 112/74 (10 May 2022 09:58) (98/71 - 135/78)  RR: 18 (10 May 2022 09:58) (17 - 18)  SpO2: 93% (10 May 2022 09:58) (91% - 95%)    Physical Exam:  General: no acute distress  Neck: supple, trachea midline  Lungs: Decreased, no wheeze/rhonchi  Cardiovascular: regular rate and rhythm, S1 S2  Abdomen: soft, nontender,  bowel sounds normal  Neurological: awake   Skin: no rash  Extremities: no edema      LABS:                        11.4   16.72 )-----------( 370      ( 09 May 2022 07:25 )             36.9       144  |  107  |  12  ----------------------------<  95  3.7   |  30  |  0.67    Ca    8.5      09 May 2022 07:25      Urinalysis Basic - ( 03 May 2022 16:06 )    Color: Yellow / Appearance: Clear / S.015 / pH: x  Gluc: x / Ketone: Negative  / Bili: Negative / Urobili: Negative mg/dL   Blood: x / Protein: 15 mg/dL / Nitrite: Negative   Leuk Esterase: Moderate / RBC: 0-2 /HPF / WBC 6-10   Sq Epi: x / Non Sq Epi: Few / Bacteria: Few    MICROBIOLOGY:  Culture - Urine (22 @ 16:06)    -  Amikacin: S <=16    -  Amoxicillin/Clavulanic Acid: R >16/8    -  Ampicillin: R >16 These ampicillin results predict results for amoxicillin    -  Ampicillin/Sulbactam: S 8/4 Enterobacter, Klebsiella aerogenes, Citrobacter, and Serratia may develop resistance during prolonged therapy (3-4 days)    -  Aztreonam: S <=4    -  Cefazolin: S 16 (MIC_CL_COM_ENTERIC_CEFAZU) For uncomplicated UTI with K. pneumoniae, E. coli, or P. mirablis: ALYSE <=16 is sensitive and ALYSE >=32 is resistant. This also predicts results for oral agents cefaclor, cefdinir, cefpodoxime, cefprozil, cefuroxime axetil, cephalexin and locarbef for uncomplicated UTI. Note that some isolates may be susceptible to these agents while testing resistant to cefazolin.    -  Cefepime: S <=2    -  Cefoxitin: I 16    -  Ceftriaxone: S <=1 Enterobacter, Klebsiella aerogenes, Citrobacter, and Serratia may develop resistance during prolonged therapy    -  Ciprofloxacin: R >2    -  Ertapenem: S <=0.5    -  Gentamicin: S <=2    -  Imipenem: S <=1    -  Levofloxacin: R >4    -  Meropenem: S <=1    -  Nitrofurantoin: S <=32 Should not be used to treat pyelonephritis    -  Piperacillin/Tazobactam: S <=8    -  Tigecycline: S <=2    -  Tobramycin: S <=2    -  Trimethoprim/Sulfamethoxazole: S <=0.5/9.5    Specimen Source: Clean Catch Clean Catch (Midstream)    Culture Results:   >100,000 CFU/ml Escherichia coli    Organism Identification: Escherichia coli    Organism: Escherichia coli    Method Type: ALYSE      Culture - Blood (collected 03 May 2022 15:36)  Source: .Blood Blood-Peripheral  Preliminary Report (04 May 2022 23:01):    No growth to date.    Culture - Blood (collected 03 May 2022 15:36)  Source: .Blood Blood-Peripheral  Preliminary Report (04 May 2022 23:01):    No growth to date.    Legionella pneumophila Antigen, Urine (22 @ 16:31)    Legionella Antigen, Urine: Negative    Respiratory Viral Panel with COVID-19 by ALEJANDRA (22 @ 15:36)    Rapid RVP Result: Detected    SARS-CoV-2: NotDetec: This Respiratory Panel uses polymerase chain reaction (PCR) to detect for  adenovirus; coronavirus (HKU1, NL63, 229E, OC43); human metapneumovirus  (hMPV); human enterovirus/rhinovirus (Entero/RV); influenza A; influenza  A/H1; influenza A/H3; influenza A/H1-2009; influenza B; parainfluenza  viruses 1, 2, 3, 4; respiratory syncytial virus; Mycoplasma pneumoniae;  Chlamydophila pneumoniae; and SARS-CoV-2.    Adenovirus (RapRVP): NotDetec    Influenza A (RapRVP): NotDetec    Influenza AH1 2009 (RapRVP): NotDetec    Influenza AH1 (RapRVP): NotDetec    Influenza AH3 (RapRVP): Detected    Influenza B (RapRVP): NotDetec    Parainfluenza 1 (RapRVP): NotDetec    Parainfluenza 2 (RapRVP): NotDetec    Parainfluenza 3 (RapRVP): NotDetec    Parainfluenza 4 (RapRVP): NotDetec    Chlamydia pneumoniae (RapRVP): NotDetec    Mycoplasma pneumoniae (RapRVP): NotDetec    Entero/Rhinovirus (RapRVP): NotDetec    hMPV (RapRVP): NotDetec    Coronavirus (229E,HKU1,NL63,OC43): NotDetec    RADIOLOGY & ADDITIONAL STUDIES:    ACC: [id number] EXAM:  XR CHEST PORTABLE URGENT 1V                          PROCEDURE DATE:  2022          INTERPRETATION:  Sepsis.    AP chest.    Normal heart size. Atherosclerotic aorta. Hyperinflated lungs. Airspace   infiltrates in the right mid and lower lung field and at the left base   consistent with bilateral pneumonia. Trace bilateral pleural effusions.   Question underlying nodular opacity left base. Correlate with chest CT.   Calcified granuloma right apex. Biapical pleural thickening.    IMPRESSION: Bilateral pneumonia. Question underlying nodular opacity left   base. Correlate with chest CT.    Assessment :   88YO F PMH atrial fibrillation, COPD, history of pericardial effusion, pleural effusion in past, orthostatic hypotension, dementia, presented from [facility] admitted with Influenza A with ovidio pneumonia. - probable superimposed bacterial pneumonia.   Asymptomatic bacteruria  WBC fluctuating  MBS with dysphagia  Overall stable    Plan :   Completed Tamiflu x 5 days ended 22  Completed course of  Zosyn x 7 days  Trend temps and cbc  Asp precautions  Stable from ID standpoint  Dc planning to rehab    D/w Dr Ramirez      Continue with present regiment.  Appropriate use of antibiotics and adverse effects reviewed.    > 35 minutes were spent in direct patient care reviewing notes, medications ,labs data/ imaging , discussion with multidisciplinary team.    Thank you for allowing me to participate in care of your patient .    Karthikeyan Dai MD  Infectious Disease  [phone]

## 2022-05-10 NOTE — DISCHARGE NOTE PROVIDER - HOSPITAL COURSE
HPI: 89 years old female with past medical history of atrial fibrillation, COPD, history of pericardial effusion, pleural effusion in past, orthostatic hypotension, dementia, who was noted to have shortness of breath and low blood pressure at SNF. Patient was sent in to ER. In Er, patient is noted to have elevated WBC, Pneumonia on chest x-ray. She is being admitted for further care.     Hospital course:  Patient had bilateral pneumonia, possible HCAP vs post influenza pneumonia.   Patient also tested positive for Influenza A.   Patient had possible sepsis, POA due to pneumonia.   Patient also had acute respiratory failure with hypoxemia, due to pneumonia.   Patient slowly improved.   She was treated with IV Zosyn and Tamiflu orally.   Patient was seen by ID, pulmonary and palliative care.   She has Severe protein calorie malnutrition, POA.   She also has atrial fibrillation.   Patient is being discharged back to SNF for LTC.

## 2022-05-10 NOTE — PROGRESS NOTE ADULT - REASON FOR ADMISSION
Shortness of breath, low BP

## 2022-05-10 NOTE — DISCHARGE NOTE PROVIDER - NSDCCPCAREPLAN_GEN_ALL_CORE_FT
PRINCIPAL DISCHARGE DIAGNOSIS  Diagnosis: Pneumonia  Assessment and Plan of Treatment: Complete course of antibiotics as prescribed.   Increase activity as tolerated.   Speech pathology follow up for advancing the diet at NH.   Aspiration precuations.   Follow up with Dr. Ramirez in 1-3 days.      SECONDARY DISCHARGE DIAGNOSES  Diagnosis: Sepsis  Assessment and Plan of Treatment:     Diagnosis: Influenza A  Assessment and Plan of Treatment:     Diagnosis: Hypokalemia  Assessment and Plan of Treatment:     Diagnosis: Dysphagia  Assessment and Plan of Treatment:     Diagnosis: Severe protein-calorie malnutrition  Assessment and Plan of Treatment:     Diagnosis: Chronic atrial fibrillation  Assessment and Plan of Treatment:

## 2022-05-10 NOTE — PROGRESS NOTE ADULT - ASSESSMENT
89 years old female with past medical history of atrial fibrillation, COPD, history of pericardial effusion, pleural effusion in past, orthostatic hypotension, dementia, who was noted to have shortness of breath and low blood pressure at SNF. Patient was sent in to ER. In Er, patient is noted to have elevated WBC, Pneumonia on chest x-ray. She is being admitted for further care. 
89 years old female with past medical history of atrial fibrillation, COPD, history of pericardial effusion, pleural effusion in past, orthostatic hypotension, dementia, who was noted to have shortness of breath and low blood pressure at SNF. Patient was sent in to ER. In Er, patient is noted to have elevated WBC, Pneumonia on chest x-ray. She is being admitted for further care. 
Elderly pt. with COPD admitted for RLL/LLL pneumonia, Influenza.  Stable. Oxygenating well.   ID note apprec.   Suggest continue antibiotics, tamiflu.  FU CXR  DNR  Inhalers.  
Elderly pt. with COPD admitted for RLL/LLL pneumonia, Influenza.  Stable.   Suggest continue antibiotics, tamiflu.  FU CXR  DNR  Inhalers.  Check K.
Elderly pt. with COPD admitted for RLL/LLL pneumonia, Influenza.  Stable. Oxygenating well.   ID note apprec.   Suggest transition to po antibiotics.  DC planning.     DNR  Inhalers.  
89 years old female with past medical history of atrial fibrillation, COPD, history of pericardial effusion, pleural effusion in past, orthostatic hypotension, dementia, who was noted to have shortness of breath and low blood pressure at SNF. Patient was sent in to ER. In Er, patient is noted to have elevated WBC, Pneumonia on chest x-ray. She is being admitted for further care. 

## 2022-05-10 NOTE — PROGRESS NOTE ADULT - PROBLEM SELECTOR PROBLEM 10
Prophylactic measure
Orthostatic hypotension

## 2022-05-10 NOTE — PROGRESS NOTE ADULT - PROBLEM SELECTOR PLAN 5
MBS noted.    Continue dysphagia diet.
DuoNeb nebulizations as needed.   Oxygen supplementation.
MBS noted.    Continue dysphagia diet.
Speech eval noted.   Continue dysphagia diet.

## 2022-05-10 NOTE — PROGRESS NOTE ADULT - PROBLEM SELECTOR PLAN 2
Patient with pneumonia, possible HCAP vs post influenza pneumonia.   Concern is for Gram negative organisms.   Completed Zosyn as per ID.  Will discharge to NH.

## 2022-05-10 NOTE — PROGRESS NOTE ADULT - PROBLEM SELECTOR PLAN 4
Arousable
Improved with supplementation.   Monitor BMP.
Continue Amiodarone.   Anticoagulated with Eliquis.
Improved with supplementation.   Will replete again.   Monitor BMP.
Improved with supplementation.   Monitor BMP.

## 2022-09-19 NOTE — PROGRESS NOTE ADULT - PROBLEM SELECTOR PLAN 9
DuoNeb nebulizations as needed.   Oxygen supplementation as needed.
Detail Level: Zone
Include Location In Plan?: No
DuoNeb nebulizations as needed.   Oxygen supplementation as needed.
Continue Midodrine.
DuoNeb nebulizations as needed.   Oxygen supplementation as needed.

## 2023-08-04 NOTE — PHYSICAL THERAPY INITIAL EVALUATION ADULT - BED MOBILITY TRAINING, PT EVAL
Pt found supine in bed with sling left UE. Pt c/o pain left lower arm 4/10. 1 week: bed mobility with Cgax1